# Patient Record
Sex: MALE | Race: WHITE | NOT HISPANIC OR LATINO | Employment: FULL TIME | URBAN - METROPOLITAN AREA
[De-identification: names, ages, dates, MRNs, and addresses within clinical notes are randomized per-mention and may not be internally consistent; named-entity substitution may affect disease eponyms.]

---

## 2018-07-17 ENCOUNTER — OFFICE VISIT (OUTPATIENT)
Dept: URGENT CARE | Facility: URGENT CARE | Age: 63
End: 2018-07-17
Payer: COMMERCIAL

## 2018-07-17 VITALS
TEMPERATURE: 100.2 F | DIASTOLIC BLOOD PRESSURE: 72 MMHG | SYSTOLIC BLOOD PRESSURE: 148 MMHG | RESPIRATION RATE: 24 BRPM | HEART RATE: 80 BPM

## 2018-07-17 DIAGNOSIS — L03.115 CELLULITIS OF RIGHT LOWER EXTREMITY: ICD-10-CM

## 2018-07-17 DIAGNOSIS — M1A.0711 IDIOPATHIC CHRONIC GOUT OF RIGHT FOOT WITH TOPHUS: ICD-10-CM

## 2018-07-17 DIAGNOSIS — M10.9 ACUTE GOUTY ARTHRITIS: Primary | ICD-10-CM

## 2018-07-17 LAB
BASOPHILS # BLD AUTO: 0 10E9/L (ref 0–0.2)
BASOPHILS NFR BLD AUTO: 0.1 %
DIFFERENTIAL METHOD BLD: ABNORMAL
EOSINOPHIL # BLD AUTO: 0.1 10E9/L (ref 0–0.7)
EOSINOPHIL NFR BLD AUTO: 1 %
ERYTHROCYTE [DISTWIDTH] IN BLOOD BY AUTOMATED COUNT: 13.6 % (ref 10–15)
ERYTHROCYTE [SEDIMENTATION RATE] IN BLOOD BY WESTERGREN METHOD: 21 MM/H (ref 0–20)
HCT VFR BLD AUTO: 42.2 % (ref 40–53)
HGB BLD-MCNC: 14 G/DL (ref 13.3–17.7)
LYMPHOCYTES # BLD AUTO: 1.3 10E9/L (ref 0.8–5.3)
LYMPHOCYTES NFR BLD AUTO: 13.5 %
MCH RBC QN AUTO: 32.2 PG (ref 26.5–33)
MCHC RBC AUTO-ENTMCNC: 33.2 G/DL (ref 31.5–36.5)
MCV RBC AUTO: 97 FL (ref 78–100)
MONOCYTES # BLD AUTO: 0.7 10E9/L (ref 0–1.3)
MONOCYTES NFR BLD AUTO: 6.8 %
NEUTROPHILS # BLD AUTO: 7.7 10E9/L (ref 1.6–8.3)
NEUTROPHILS NFR BLD AUTO: 78.6 %
PLATELET # BLD AUTO: 215 10E9/L (ref 150–450)
RBC # BLD AUTO: 4.35 10E12/L (ref 4.4–5.9)
WBC # BLD AUTO: 9.7 10E9/L (ref 4–11)

## 2018-07-17 PROCEDURE — 85652 RBC SED RATE AUTOMATED: CPT | Performed by: NURSE PRACTITIONER

## 2018-07-17 PROCEDURE — 85025 COMPLETE CBC W/AUTO DIFF WBC: CPT | Performed by: NURSE PRACTITIONER

## 2018-07-17 PROCEDURE — 99213 OFFICE O/P EST LOW 20 MIN: CPT | Performed by: NURSE PRACTITIONER

## 2018-07-17 PROCEDURE — 36415 COLL VENOUS BLD VENIPUNCTURE: CPT | Performed by: NURSE PRACTITIONER

## 2018-07-17 PROCEDURE — 84550 ASSAY OF BLOOD/URIC ACID: CPT | Performed by: NURSE PRACTITIONER

## 2018-07-17 RX ORDER — PREDNISONE 20 MG/1
TABLET ORAL
Qty: 10 TABLET | Refills: 0 | Status: SHIPPED | OUTPATIENT
Start: 2018-07-17 | End: 2019-06-27

## 2018-07-17 RX ORDER — COLCHICINE 0.6 MG/1
TABLET ORAL
Qty: 15 TABLET | Refills: 0 | Status: SHIPPED | OUTPATIENT
Start: 2018-07-17 | End: 2019-06-27

## 2018-07-17 RX ORDER — PREDNISONE 20 MG/1
TABLET ORAL
Qty: 16 TABLET | Refills: 0 | Status: CANCELLED | OUTPATIENT
Start: 2018-07-17

## 2018-07-17 RX ORDER — CEPHALEXIN 500 MG/1
500 CAPSULE ORAL 4 TIMES DAILY
Qty: 40 CAPSULE | Refills: 0 | Status: SHIPPED | OUTPATIENT
Start: 2018-07-17 | End: 2019-08-06

## 2018-07-17 ASSESSMENT — PAIN SCALES - GENERAL: PAINLEVEL: EXTREME PAIN (9)

## 2018-07-17 NOTE — MR AVS SNAPSHOT
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  Uric acid was ordered today. If elevated, patient to follow up with primary physician to discuss possible prophylactic medication.  Follow up with primary physician for recheck.      Gout    Gout is an inflammation of a joint due to a build-up of gout crystals in the joint fluid. This occurs when there is an excess of uric acid (a normal waste product) in the body. Uric acid builds up in the body when the kidneys are unable to filter enough of it from the blood. This may occur with age. It is also associated with kidney disease. Gout occurs more often in people with obesity, diabetes, high blood pressure, or high levels of fats in the blood. It may run in families. Gout tends to come and go. A flare up of gout is called an attack. Drinking alcohol or eating certain foods (such as shellfish or foods with additives such as high-fructose corn syrup) may increase uric acid levels in the blood and cause a gout attack.  During a gout attack, the affected joint may become a hot, red, swollen and painful. If you have had one attack of gout, you are likely to have another. An attack of gout can be treated with medicine. If these attacks become frequent, a daily medicine may be prescribed to help the kidneys remove uric acid from the body.  Home care  During a gout attack:    Rest painful joints. If gout affects the joints of your foot or leg, you may want to use crutches for the first few days to keep from bearing weight on the  affected joint.    When sitting or lying down, raise the painful joint to a level higher than your heart.    Apply an ice pack (ice cubes in a plastic bag wrapped in a thin towel) over the injured area for 20 minutes every 1 to 2 hours the first day for pain relief. Continue this 3 to 4 times a day for swelling and pain.    Avoid alcohol and foods listed below (see Preventing attacks) during a gout attack. Drink extra fluid to help flush the uric acid through your kidneys.    If you were prescribed a medicine to treat gout, take it as your healthcare provider has instructed. Don't skip doses.    Take anti-inflammatory medicine as directed.     If pain medicines have been prescribed, take them exactly as directed.    Preventing attacks    Minimize or avoid alcohol use. Excess alcohol intake can cause a gout attack.    Limit these foods and beverages:  ? Organ meats, such as kidneys and liver  ? Certain seafoods (anchovies, sardines, shrimp, scallops, herring, mackerel)  ? Wild game, meat extracts and meat gravies  ? Foods and beverages sweetened with high-fructose corn syrup, such as sodas    Eat a healthy diet including low-fat and nonfat dairy, whole grains, and vegetables.    If you are overweight, talk to your healthcare provider about a weight reduction plan. Avoid fasting or extreme low calorie diets (less than 900 calories per day). This will increase uric acid levels in the body.    If you have diabetes or high blood pressure, work with your doctor to manage these conditions.    Protect the joint from injury. Trauma can trigger a gout attack.  Follow-up care  Follow up with your healthcare provider, or as advised.   When to seek medical advice  Call your healthcare provider if you have any of the following:    Fever over 100.4 F (38. C) with worsening joint pain    Increasing redness around the joint    Pain developing in another joint    Repeated vomiting, abdominal pain, or blood in the vomit or stool  (black or red color)  Date Last Reviewed: 3/1/2017    2398-0301 [employer]. [address] [place] [address], [place] [address]. All rights reserved. This information is not intended as a substitute for professional medical care. Always follow your healthcare professional's instructions.        Treating Gout Attacks     Raising the joint above the level of your heart can help reduce gout symptoms.     Gout is a disease that affects the joints. It is caused by excess uric acid in your blood stream that may lead to crystals forming in your joints. Left untreated, it can lead to painful foot and joint deformities and even kidney problems. But, by treating gout early, you can relieve pain and help prevent future problems. Gout can usually be treated with medication and proper diet. In severe cases, surgery may be needed.  Gout attacks are painful and often happen more than once. Taking medications may reduce pain and prevent attacks in the future. There are also some things you can do at home to relieve symptoms.  Medications for gout  Your healthcare provider may prescribe a daily medication to reduce levels of uric acid. Reducing your uric acid levels may help prevent gout attacks. Allopurinol is one commonly used medication taken daily to reduce uric acid levels. Other medications can help relieve pain and swelling during an acute attack. Medicines such as NSAIDs (nonsteroidal anti-inflammatory medicines), steroids, and colchicine may be prescribed for intermittent use to relieve an acute gout attack. Be sure to take your medication as directed.  What you can do  Below are some things you can do at home to relieve gout symptoms. Your healthcare provider may have other tips.    Rest the painful joint as much as you can.    Raise the painful joint so it is at a level higher than your heart.    Use ice for 10 minutes every 1-2 hours as possible.  How can I prevent gout?  With a little effort, you may be able to  prevent gout attacks in the future. Here are some things you can do:    Avoid foods high in purines  ? Certain meats (red meat, processed meat, turkey)  ? Organ meats (kidney, liver, sweetbread)  ? Shellfish (lobster, crab, shrimp, scallop, mussel)  ? Certain fish (anchovy, sardine, herring, mackerel)    Take any medications prescribed by your healthcare provider.    Lose weight if you need to.    Reduce high fructose corn syrup in meals and drinks.    Reduce or eliminate consumption of alcohol, particularly beer, but also red wine and spirits.    Control blood pressure, diabetes, and cholesterol.    Drink plenty of water to help flush uric acid from your body.  Date Last Reviewed: 2/1/2016 2000-2017 [employer]. [address] [place] [id number]. All rights reserved. This information is not intended as a substitute for professional medical care. Always follow your healthcare professional's instructions.        Gout Diet  Gout is a painful condition caused by an excess of uric acid, a waste product made by the body. Uric acid forms crystals that collect in the joints. The immune response to these crystals brings on symptoms of joint pain and swelling. This is called a gout attack. Often, medications and diet changes are combined to manage gout. Below are some guidelines for changing your diet to help you manage gout and prevent attacks. Your health care provider will help you determine the best eating plan for you.     Eating to manage gout  Weight loss for those who are overweight may help reduce gout attacks.  Eat less of these foods  Eating too many foods containing purines may raise the levels of uric acid in your body. This raises your risk for a gout attack. Try to limit these foods and drinks:    Alcohol, such as beer and red wine. You may be told to avoid alcohol completely.    Soft drinks that contain sugar or high fructose corn syrup    Certain fish, including anchovies, sardines,  fish eggs, and herring    Shellfish    Certain meats, such as red meat, hot dogs, luncheon meats, and turkey    Organ meats, such as liver, kidneys, and sweetbreads    Legumes, such as dried beans and peas    Other high fat foods such as gravy, whole milk, and high fat cheeses    Vegetables such as asparagus, cauliflower, spinach, and mushrooms used to be thought to contribute to an increased risk for a gout attack, but recent studies show that high purine vegetables don't increase the risk for a gout attack.  Eat more of these foods  Other foods may be helpful for people with gout. Add some of these foods to your diet:    Cherries contain chemicals that may lower uric acid.    Omega fatty acids. These are found in some fatty fish such as salmon, certain oils (flax, olive, or nut), and nuts themselves. Omega fatty acids may help prevent inflammation due to gout.    Dairy products that are low-fat or fat-free, such as cheese and yogurt    Complex carbohydrate foods, including whole grains, brown rice, oats, and beans    Coffee, in moderation    Water, approximately 64 ounces per day  Follow-up care  Follow up with your healthcare provider as advised.  When to seek medical advice  Call your healthcare provider right away if any of these occur:    Return of gout symptoms, usually at night:    Severe pain, swelling, and heat in a joint, especially the base of the big toe    Affected joint is hard to move    Skin of the affected joint is purple or red    Fever of 100.4 F (38 C) or higher    Pain that doesn't get better even with prescribed medicine   Date Last Reviewed: 1/12/2016 2000-2017 [employer]. [address] [place] [address], [place] [address]. All rights reserved. This information is not intended as a substitute for professional medical care. Always follow your healthcare professional's instructions.        Eating to Prevent Gout  Gout is a painful form of arthritis caused by an excess of uric acid.  This is a waste product made by the body. It builds up in the body and forms crystals that collect in the joints, causing a gout attack. Alcohol and certain foods can trigger a gout attack. Below are some guidelines for changing your diet to help you manage gout. Your healthcare provider can work with you to determine the best eating plan for you. Know that diet is only one part of managing gout. Take your medicines as prescribed and follow the other guidelines your healthcare provider has given you.  Foods to limit  Eating too many foods containing purines may increase the levels of uric acid in your body and increase your risk for a gout attack. It may be best to limit these high-purine foods:    Alcohol (beer and red wine). You may be told to avoid alcohol completely.    Certain fish (anchovies, sardines, fish roes, herring, tuna, mussels, codfish, scallops, trout, and galo)    Certain meats (red meat, processed meat, boss, turkey, wild game, and goose)    Sauces and gravies made with meat    Organ meats (such as liver, kidneys, sweetbreads, and tripe)    Legumes (such as dried beans and peas)    Mushrooms, spinach, asparagus, and cauliflower    Yeast and yeast extract supplements  Foods to try  Some foods may be helpful for people with gout. You may want to try adding some of the following foods to your diet:    Dark berries. These include blueberries, blackberries, and cherries. These berries contain chemicals that may lower uric acid.    Tofu. Tofu, which is made from soy, is a good source of protein. Studies have shown that it may be a better choice than meat for people with gout.    Omega fatty acids. These acids are found in fatty fish (such as salmon), certain oils (such as flax, olive, or nut oils), or nuts. They may help prevent inflammation due to gout.  The following guidelines are recommended by the American Medical Association for people with gout. Your diet should be:    High in fiber, whole  grains, fruits, and vegetables.    Low in protein (15% of calories should come from protein. Choose lean sources, such as soy, lean meats, and poultry).    Low in fat (no more than 30% of calories should come from fat, with only 10% coming from animal, or saturated, fat).   Date Last Reviewed: 11/1/2017 2000-2017 [employer]. [address] [place] [address]. All rights reserved. This information is not intended as a substitute for professional medical care. Always follow your healthcare professional's instructions.        Discharge Instructions for Cellulitis  You have been diagnosed with cellulitis. This is an infection in the deepest layer of the skin. In some cases, the infection also affects the muscle. Cellulitis is caused by bacteria. The bacteria can enter the body through broken skin. This can happen with a cut, scratch, animal bite, or an insect bite that has been scratched. You may have been treated in the hospital with antibiotics and fluids. You will likely be given a prescription for antibiotics to take at home. This sheet will help you take care of yourself at home.  Home care  When you are home:    Take the prescribed antibiotic medicine you are given as directed until it is gone. Take it even if you feel better. It treats the infection and stops it from returning. Not taking all the medicine can make future infections hard to treat.    Keep the infected area clean.    When possible, raise the infected area above the level of your heart. This helps keep swelling down.    Talk with your healthcare provider if you are in pain. Ask what kind of over-the-counter medicine you can take for pain.    Apply clean bandages as advised.    Take your temperature once a day for a week.    Wash your hands often to prevent spreading the infection.  In the future, wash your hands before and after you touch cuts, scratches, or bandages. This will help prevent infection.   When to call your  healthcare provider  Call your healthcare provider immediately if you have any of the following:    Difficulty or pain when moving the joints above or below the infected area    Discharge or pus draining from the area    Fever of 100.4 F (38 C) or higher, or as directed by your healthcare provider    Pain that gets worse in or around the infected     Redness that gets worse in or around the infected area, particularly if the area of redness expands to a wider area    Shaking chills    Swelling of the infected area    Vomiting   Date Last Reviewed: 8/1/2016 2000-2017 [employer]. [address] [place] [address]. All rights reserved. This information is not intended as a substitute for professional medical care. Always follow your healthcare professional's instructions.                Follow-ups after your visit        Who to contact     If you have questions or need follow up information about today's clinic visit or your schedule please contact [facility] directly at [phone].  Normal or non-critical lab and imaging results will be communicated to you by MyChart, letter or phone within 4 business days after the clinic has received the results. If you do not hear from us within 7 days, please contact the clinic through [employer] or phone. If you have a critical or abnormal lab result, we will notify you by phone as soon as possible.  Submit refill requests through [employer] or call your pharmacy and they will forward the refill request to us. Please allow 3 business days for your refill to be completed.          Additional Information About Your Visit        [employer] Information     [employer] gives you secure access to your electronic health record. If you see a primary care provider, you can also send messages to your care team and make appointments. If you have questions, please call your primary care clinic.  If you do not have a primary care provider,  please call [phone] and they will assist you.        Care EveryWhere ID     This is your Care EveryWhere ID. This could be used by other organizations to access your [place] medical records  [id number]        Your Vitals Were     Pulse Temperature Respirations             80 100.2  F (37.9  C) (Tympanic) 24          Blood Pressure from Last 3 Encounters:   07/17/18 148/72   05/05/16 (!) 160/97   11/23/14 152/79    Weight from Last 3 Encounters:   05/20/14 247 lb (112 kg)   01/27/14 247 lb 6.4 oz (112.2 kg)   07/12/13 244 lb (110.7 kg)              We Performed the Following     CBC with platelets differential     ESR: Erythrocyte sedimentation rate     Uric acid          Today's Medication Changes          These changes are accurate as of 7/17/18  8:20 PM.  If you have any questions, ask your nurse or doctor.               Start taking these medicines.        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   Quantity:  10 tablet   Refills:  0         These medicines have changed or have updated prescriptions.        Dose/Directions    * colchicine 0.6 MG tablet   Commonly known as:  COLCRYS   This may have changed:  Another medication with the same name was added. Make sure you understand how and when to take each.   Changed by:  Eve Morrison APRN CNP        Take 2 tablets at the first sign of flare, take 1 additional tablet one hour later.   Quantity:  15 tablet   Refills:  0       * colchicine 0.6 MG tablet   Commonly known as:  COLCRYS   This may have changed:  You were  already taking a medication with the same name, and this prescription was added. Make sure you understand how and when to take each.   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   Quantity:  15 tablet   Refills:  0       * Notice:  This list has 2 medication(s) that are the same as other medications prescribed for you. Read the directions carefully, and ask your doctor or other care provider to review them with you.         Where to get your medicines      These medications were sent to [id number] - [place], [place] - [address]  [address] [address]    Hours:  Closed 10-16-08 business to [facility] Phone:  [phone]     cephALEXin 500 MG capsule    colchicine 0.6 MG tablet    predniSONE 20 MG tablet                Primary Care Provider Office Phone # Fax #    José Miguel Maynard [phone] [address] [facility] [address]        Equal Access to Services     [facility]: Hadii darshana ku hadasho Soomaali, waaxda luqadaha, qaybta kaalmada adeegyada, waxay tish brewer . So [facility] [phone].    ATENCIÓN: Si habla español, tiene a rivera disposición servicios gratuitos de asistencia lingüística. Llame al [phone].    We comply with applicable federal civil rights laws and [place] laws. We do not discriminate on the basis of race, color, national origin, age, disability, sex, sexual orientation, or gender identity.            Thank you!     Thank you for choosing [facility]  for your care. Our goal is always to provide you with excellent care. Hearing back from our patients is one way we can continue to improve our services. Please take a few minutes to complete the written survey that you may receive in the mail after your visit with us. Thank  you!             Your Updated Medication List - Protect others around you: Learn how to safely use, store and throw away your medicines at www.disposemymeds.org.          This list is accurate as of 7/17/18  8:20 PM.  Always use your most recent med list.                   Brand Name Dispense Instructions for use Diagnosis    cephALEXin 500 MG capsule    KEFLEX    40 capsule    Take 1 capsule (500 mg) by mouth 4 times daily    Cellulitis of right lower extremity       * colchicine 0.6 MG tablet    COLCRYS    15 tablet    Take 2 tablets at the first sign of flare, take 1 additional tablet one hour later.        * colchicine 0.6 MG tablet    COLCRYS    15 tablet    Take 2 tablets at the first sign of flare, take 1 additional tablet one hour later.    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 Read the directions carefully, and ask your doctor or other care provider to review them with you.

## 2018-07-18 LAB — URATE SERPL-MCNC: 7.7 MG/DL (ref 3.5–7.2)

## 2018-07-18 NOTE — NURSING NOTE
"Chief Complaint   Patient presents with     Arthritis     Started in thumb on Saturday.  Right foot started very early this morning        Initial /72 (BP Location: Right arm, Cuff Size: Adult Large)  Pulse 80  Temp 100.2  F (37.9  C) (Tympanic)  Resp 24 Estimated body mass index is 35.19 kg/(m^2) as calculated from the following:    Height as of 1/27/14: 5' 10.25\" (1.784 m).    Weight as of 5/20/14: 247 lb (112 kg).      Health Maintenance that is potentially due pending provider review:  NONE    n/a    Is there anyone who you would like to be able to receive your results? Not Applicable  If yes have patient fill out DRISS  Shady Baldwin M.A.        "

## 2018-07-18 NOTE — PATIENT INSTRUCTIONS
Activity as tolerated.  Uric acid was ordered today. If elevated, patient to follow up with primary physician to discuss possible prophylactic medication.  Follow up with primary physician for recheck.      Gout    Gout is an inflammation of a joint due to a build-up of gout crystals in the joint fluid. This occurs when there is an excess of uric acid (a normal waste product) in the body. Uric acid builds up in the body when the kidneys are unable to filter enough of it from the blood. This may occur with age. It is also associated with kidney disease. Gout occurs more often in people with obesity, diabetes, high blood pressure, or high levels of fats in the blood. It may run in families. Gout tends to come and go. A flare up of gout is called an attack. Drinking alcohol or eating certain foods (such as shellfish or foods with additives such as high-fructose corn syrup) may increase uric acid levels in the blood and cause a gout attack.  During a gout attack, the affected joint may become a hot, red, swollen and painful. If you have had one attack of gout, you are likely to have another. An attack of gout can be treated with medicine. If these attacks become frequent, a daily medicine may be prescribed to help the kidneys remove uric acid from the body.  Home care  During a gout attack:    Rest painful joints. If gout affects the joints of your foot or leg, you may want to use crutches for the first few days to keep from bearing weight on the affected joint.    When sitting or lying down, raise the painful joint to a level higher than your heart.    Apply an ice pack (ice cubes in a plastic bag wrapped in a thin towel) over the injured area for 20 minutes every 1 to 2 hours the first day for pain relief. Continue this 3 to 4 times a day for swelling and pain.    Avoid alcohol and foods listed below (see Preventing attacks) during a gout attack. Drink extra fluid to help flush the uric acid through your  kidneys.    If you were prescribed a medicine to treat gout, take it as your healthcare provider has instructed. Don't skip doses.    Take anti-inflammatory medicine as directed.     If pain medicines have been prescribed, take them exactly as directed.    Preventing attacks    Minimize or avoid alcohol use. Excess alcohol intake can cause a gout attack.    Limit these foods and beverages:  ? Organ meats, such as kidneys and liver  ? Certain seafoods (anchovies, sardines, shrimp, scallops, herring, mackerel)  ? Wild game, meat extracts and meat gravies  ? Foods and beverages sweetened with high-fructose corn syrup, such as sodas    Eat a healthy diet including low-fat and nonfat dairy, whole grains, and vegetables.    If you are overweight, talk to your healthcare provider about a weight reduction plan. Avoid fasting or extreme low calorie diets (less than 900 calories per day). This will increase uric acid levels in the body.    If you have diabetes or high blood pressure, work with your doctor to manage these conditions.    Protect the joint from injury. Trauma can trigger a gout attack.  Follow-up care  Follow up with your healthcare provider, or as advised.   When to seek medical advice  Call your healthcare provider if you have any of the following:    Fever over 100.4 F (38. C) with worsening joint pain    Increasing redness around the joint    Pain developing in another joint    Repeated vomiting, abdominal pain, or blood in the vomit or stool (black or red color)  Date Last Reviewed: 3/1/2017    7572-5739 [employer]. [address] [place] [address]. All rights reserved. This information is not intended as a substitute for professional medical care. Always follow your healthcare professional's instructions.        Treating Gout Attacks     Raising the joint above the level of your heart can help reduce gout symptoms.     Gout is a disease that affects the joints. It is caused by  excess uric acid in your blood stream that may lead to crystals forming in your joints. Left untreated, it can lead to painful foot and joint deformities and even kidney problems. But, by treating gout early, you can relieve pain and help prevent future problems. Gout can usually be treated with medication and proper diet. In severe cases, surgery may be needed.  Gout attacks are painful and often happen more than once. Taking medications may reduce pain and prevent attacks in the future. There are also some things you can do at home to relieve symptoms.  Medications for gout  Your healthcare provider may prescribe a daily medication to reduce levels of uric acid. Reducing your uric acid levels may help prevent gout attacks. Allopurinol is one commonly used medication taken daily to reduce uric acid levels. Other medications can help relieve pain and swelling during an acute attack. Medicines such as NSAIDs (nonsteroidal anti-inflammatory medicines), steroids, and colchicine may be prescribed for intermittent use to relieve an acute gout attack. Be sure to take your medication as directed.  What you can do  Below are some things you can do at home to relieve gout symptoms. Your healthcare provider may have other tips.    Rest the painful joint as much as you can.    Raise the painful joint so it is at a level higher than your heart.    Use ice for 10 minutes every 1-2 hours as possible.  How can I prevent gout?  With a little effort, you may be able to prevent gout attacks in the future. Here are some things you can do:    Avoid foods high in purines  ? Certain meats (red meat, processed meat, turkey)  ? Organ meats (kidney, liver, sweetbread)  ? Shellfish (lobster, crab, shrimp, scallop, mussel)  ? Certain fish (anchovy, sardine, herring, mackerel)    Take any medications prescribed by your healthcare provider.    Lose weight if you need to.    Reduce high fructose corn syrup in meals and drinks.    Reduce or  eliminate consumption of alcohol, particularly beer, but also red wine and spirits.    Control blood pressure, diabetes, and cholesterol.    Drink plenty of water to help flush uric acid from your body.  Date Last Reviewed: 2/1/2016 2000-2017 [employer]. [address] [place] [id number]. All rights reserved. This information is not intended as a substitute for professional medical care. Always follow your healthcare professional's instructions.        Gout Diet  Gout is a painful condition caused by an excess of uric acid, a waste product made by the body. Uric acid forms crystals that collect in the joints. The immune response to these crystals brings on symptoms of joint pain and swelling. This is called a gout attack. Often, medications and diet changes are combined to manage gout. Below are some guidelines for changing your diet to help you manage gout and prevent attacks. Your health care provider will help you determine the best eating plan for you.     Eating to manage gout  Weight loss for those who are overweight may help reduce gout attacks.  Eat less of these foods  Eating too many foods containing purines may raise the levels of uric acid in your body. This raises your risk for a gout attack. Try to limit these foods and drinks:    Alcohol, such as beer and red wine. You may be told to avoid alcohol completely.    Soft drinks that contain sugar or high fructose corn syrup    Certain fish, including anchovies, sardines, fish eggs, and herring    Shellfish    Certain meats, such as red meat, hot dogs, luncheon meats, and turkey    Organ meats, such as liver, kidneys, and sweetbreads    Legumes, such as dried beans and peas    Other high fat foods such as gravy, whole milk, and high fat cheeses    Vegetables such as asparagus, cauliflower, spinach, and mushrooms used to be thought to contribute to an increased risk for a gout attack, but recent studies show that high purine  vegetables don't increase the risk for a gout attack.  Eat more of these foods  Other foods may be helpful for people with gout. Add some of these foods to your diet:    Cherries contain chemicals that may lower uric acid.    Omega fatty acids. These are found in some fatty fish such as salmon, certain oils (flax, olive, or nut), and nuts themselves. Omega fatty acids may help prevent inflammation due to gout.    Dairy products that are low-fat or fat-free, such as cheese and yogurt    Complex carbohydrate foods, including whole grains, brown rice, oats, and beans    Coffee, in moderation    Water, approximately 64 ounces per day  Follow-up care  Follow up with your healthcare provider as advised.  When to seek medical advice  Call your healthcare provider right away if any of these occur:    Return of gout symptoms, usually at night:    Severe pain, swelling, and heat in a joint, especially the base of the big toe    Affected joint is hard to move    Skin of the affected joint is purple or red    Fever of 100.4 F (38 C) or higher    Pain that doesn't get better even with prescribed medicine   Date Last Reviewed: 1/12/2016 2000-2017 [employer]. [address] [place] [address]. All rights reserved. This information is not intended as a substitute for professional medical care. Always follow your healthcare professional's instructions.        Eating to Prevent Gout  Gout is a painful form of arthritis caused by an excess of uric acid. This is a waste product made by the body. It builds up in the body and forms crystals that collect in the joints, causing a gout attack. Alcohol and certain foods can trigger a gout attack. Below are some guidelines for changing your diet to help you manage gout. Your healthcare provider can work with you to determine the best eating plan for you. Know that diet is only one part of managing gout. Take your medicines as prescribed and follow the other  guidelines your healthcare provider has given you.  Foods to limit  Eating too many foods containing purines may increase the levels of uric acid in your body and increase your risk for a gout attack. It may be best to limit these high-purine foods:    Alcohol (beer and red wine). You may be told to avoid alcohol completely.    Certain fish (anchovies, sardines, fish roes, herring, tuna, mussels, codfish, scallops, trout, and galo)    Certain meats (red meat, processed meat, boss, turkey, wild game, and goose)    Sauces and gravies made with meat    Organ meats (such as liver, kidneys, sweetbreads, and tripe)    Legumes (such as dried beans and peas)    Mushrooms, spinach, asparagus, and cauliflower    Yeast and yeast extract supplements  Foods to try  Some foods may be helpful for people with gout. You may want to try adding some of the following foods to your diet:    Dark berries. These include blueberries, blackberries, and cherries. These berries contain chemicals that may lower uric acid.    Tofu. Tofu, which is made from soy, is a good source of protein. Studies have shown that it may be a better choice than meat for people with gout.    Omega fatty acids. These acids are found in fatty fish (such as salmon), certain oils (such as flax, olive, or nut oils), or nuts. They may help prevent inflammation due to gout.  The following guidelines are recommended by the American Medical Association for people with gout. Your diet should be:    High in fiber, whole grains, fruits, and vegetables.    Low in protein (15% of calories should come from protein. Choose lean sources, such as soy, lean meats, and poultry).    Low in fat (no more than 30% of calories should come from fat, with only 10% coming from animal, or saturated, fat).   Date Last Reviewed: 11/1/2017 2000-2017 [employer]. [address] [place] [address], [place] [address]. All rights reserved. This information is not intended as a substitute  for professional medical care. Always follow your healthcare professional's instructions.        Discharge Instructions for Cellulitis  You have been diagnosed with cellulitis. This is an infection in the deepest layer of the skin. In some cases, the infection also affects the muscle. Cellulitis is caused by bacteria. The bacteria can enter the body through broken skin. This can happen with a cut, scratch, animal bite, or an insect bite that has been scratched. You may have been treated in the hospital with antibiotics and fluids. You will likely be given a prescription for antibiotics to take at home. This sheet will help you take care of yourself at home.  Home care  When you are home:    Take the prescribed antibiotic medicine you are given as directed until it is gone. Take it even if you feel better. It treats the infection and stops it from returning. Not taking all the medicine can make future infections hard to treat.    Keep the infected area clean.    When possible, raise the infected area above the level of your heart. This helps keep swelling down.    Talk with your healthcare provider if you are in pain. Ask what kind of over-the-counter medicine you can take for pain.    Apply clean bandages as advised.    Take your temperature once a day for a week.    Wash your hands often to prevent spreading the infection.  In the future, wash your hands before and after you touch cuts, scratches, or bandages. This will help prevent infection.   When to call your healthcare provider  Call your healthcare provider immediately if you have any of the following:    Difficulty or pain when moving the joints above or below the infected area    Discharge or pus draining from the area    Fever of 100.4 F (38 C) or higher, or as directed by your healthcare provider    Pain that gets worse in or around the infected     Redness that gets worse in or around the infected area, particularly if the area of redness expands to a  wider area    Shaking chills    Swelling of the infected area    Vomiting   Date Last Reviewed: 8/1/2016 2000-2017 [employer]. [address] [place] [address], [place] [address]. All rights reserved. This information is not intended as a substitute for professional medical care. Always follow your healthcare professional's instructions.

## 2018-07-18 NOTE — PROGRESS NOTES
SUBJECTIVE:  Elvin Ware is a 63 year old male who presents to the clinic today for a possible gout episode. Location: 1st MTP joint of the  and feet  Onset of symptoms: 1 days ago.  Symptoms include: pain, swelling, redness  Any possible trigger?: no  Exacerbation history: every  1 3  month .  This exacerbation similar to previous attacks?: yes  Treatments tried for this attack: Tylenol, elevation, rest  Medications currently being used for gout: none     Past Medical History:   Diagnosis Date     Gout     history of gout       ROS:  CONSTITUTIONAL:NEGATIVE for fever, chills, change in weight  INTEGUMENTARY/SKIN: NEGATIVE for worrisome rashes, moles or lesions  EYES: NEGATIVE for vision changes or irritation  ENT/MOUTH: NEGATIVE for ear, mouth and throat problems  RESP:NEGATIVE for significant cough or SOB  CV: NEGATIVE for chest pain, palpitations or peripheral edema  GI: NEGATIVE for nausea, abdominal pain, heartburn, or change in bowel habits  MUSCULOSKELETAL: See above   NEURO: NEGATIVE for weakness, dizziness or paresthesias      OBJECTIVE:  /72 (BP Location: Right arm, Cuff Size: Adult Large)  Pulse 80  Temp 100.2  F (37.9  C) (Tympanic)  Resp 24   GENERAL APPEARANCE: healthy, alert and no distress  EYES: Eyes grossly normal to inspection, fundi benign and PERRL  HENT: ear canals and TM's normal and nose and mouth without ulcers or lesions  NECK: no adenopathy, no asymmetry, masses, or scars and thyroid normal to palpation  RESP: lungs clear to auscultation - no rales, rhonchi or wheezes  CV: regular rates and rhythm, normal S1 S2, no S3 or S4 and no murmur, click or rub -  MS: extremities normal- no gross deformities noted  SKIN: no suspicious lesions or rashes  NEURO: Normal strength and tone, sensory exam grossly normal, mentation intact and speech normal  EXAM:  swelling, redness, tenderness over the first metatarso-phalangeal joint. Extending into the foot.  Small abrasion noted at the tip  of the greater toe     Results for orders placed or performed in visit on 07/17/18   ESR: Erythrocyte sedimentation rate   Result Value Ref Range    Sed Rate 21 (H) 0 - 20 mm/h   CBC with platelets differential   Result Value Ref Range    WBC 9.7 4.0 - 11.0 10e9/L    RBC Count 4.35 (L) 4.4 - 5.9 10e12/L    Hemoglobin 14.0 13.3 - 17.7 g/dL    Hematocrit 42.2 40.0 - 53.0 %    MCV 97 78 - 100 fl    MCH 32.2 26.5 - 33.0 pg    MCHC 33.2 31.5 - 36.5 g/dL    RDW 13.6 10.0 - 15.0 %    Platelet Count 215 150 - 450 10e9/L    Diff Method Automated Method     % Neutrophils 78.6 %    % Lymphocytes 13.5 %    % Monocytes 6.8 %    % Eosinophils 1.0 %    % Basophils 0.1 %    Absolute Neutrophil 7.7 1.6 - 8.3 10e9/L    Absolute Lymphocytes 1.3 0.8 - 5.3 10e9/L    Absolute Monocytes 0.7 0.0 - 1.3 10e9/L    Absolute Eosinophils 0.1 0.0 - 0.7 10e9/L    Absolute Basophils 0.0 0.0 - 0.2 10e9/L         ASSESSMENT:    ICD-10-CM    1. Acute gouty arthritis M10.9 Uric acid     ESR: Erythrocyte sedimentation rate     CBC with platelets differential   2. 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 Cellulitis of right lower extremity L03.115 cephALEXin (KEFLEX) 500 MG capsule         PLAN:  Patient presented with typical presentation of his gout.  His typical flareups are every 3 months patient did have a low-grade fever with this flareup.  Workup for septic joint then with white blood cell count within normal limits sed rate mildly elevated did see an abrasion to the greater toe concern that cellulitis is also component with the goal will treat with  a course of Keflex and he will be on prednisone and colchicine for his gout.      Patient Instructions     Activity as tolerated.  Uric acid was ordered today. If elevated, patient to follow up with primary physician to discuss possible prophylactic medication.  Follow up with primary physician for  recheck.      Gout    Gout is an inflammation of a joint due to a build-up of gout crystals in the joint fluid. This occurs when there is an excess of uric acid (a normal waste product) in the body. Uric acid builds up in the body when the kidneys are unable to filter enough of it from the blood. This may occur with age. It is also associated with kidney disease. Gout occurs more often in people with obesity, diabetes, high blood pressure, or high levels of fats in the blood. It may run in families. Gout tends to come and go. A flare up of gout is called an attack. Drinking alcohol or eating certain foods (such as shellfish or foods with additives such as high-fructose corn syrup) may increase uric acid levels in the blood and cause a gout attack.  During a gout attack, the affected joint may become a hot, red, swollen and painful. If you have had one attack of gout, you are likely to have another. An attack of gout can be treated with medicine. If these attacks become frequent, a daily medicine may be prescribed to help the kidneys remove uric acid from the body.  Home care  During a gout attack:    Rest painful joints. If gout affects the joints of your foot or leg, you may want to use crutches for the first few days to keep from bearing weight on the affected joint.    When sitting or lying down, raise the painful joint to a level higher than your heart.    Apply an ice pack (ice cubes in a plastic bag wrapped in a thin towel) over the injured area for 20 minutes every 1 to 2 hours the first day for pain relief. Continue this 3 to 4 times a day for swelling and pain.    Avoid alcohol and foods listed below (see Preventing attacks) during a gout attack. Drink extra fluid to help flush the uric acid through your kidneys.    If you were prescribed a medicine to treat gout, take it as your healthcare provider has instructed. Don't skip doses.    Take anti-inflammatory medicine as directed.     If pain medicines have  been prescribed, take them exactly as directed.    Preventing attacks    Minimize or avoid alcohol use. Excess alcohol intake can cause a gout attack.    Limit these foods and beverages:  ? Organ meats, such as kidneys and liver  ? Certain seafoods (anchovies, sardines, shrimp, scallops, herring, mackerel)  ? Wild game, meat extracts and meat gravies  ? Foods and beverages sweetened with high-fructose corn syrup, such as sodas    Eat a healthy diet including low-fat and nonfat dairy, whole grains, and vegetables.    If you are overweight, talk to your healthcare provider about a weight reduction plan. Avoid fasting or extreme low calorie diets (less than 900 calories per day). This will increase uric acid levels in the body.    If you have diabetes or high blood pressure, work with your doctor to manage these conditions.    Protect the joint from injury. Trauma can trigger a gout attack.  Follow-up care  Follow up with your healthcare provider, or as advised.   When to seek medical advice  Call your healthcare provider if you have any of the following:    Fever over 100.4 F (38. C) with worsening joint pain    Increasing redness around the joint    Pain developing in another joint    Repeated vomiting, abdominal pain, or blood in the vomit or stool (black or red color)  Date Last Reviewed: 3/1/2017    5493-0856 [employer]. [address] [place] [address]. All rights reserved. This information is not intended as a substitute for professional medical care. Always follow your healthcare professional's instructions.        Treating Gout Attacks     Raising the joint above the level of your heart can help reduce gout symptoms.     Gout is a disease that affects the joints. It is caused by excess uric acid in your blood stream that may lead to crystals forming in your joints. Left untreated, it can lead to painful foot and joint deformities and even kidney problems. But, by treating gout early,  you can relieve pain and help prevent future problems. Gout can usually be treated with medication and proper diet. In severe cases, surgery may be needed.  Gout attacks are painful and often happen more than once. Taking medications may reduce pain and prevent attacks in the future. There are also some things you can do at home to relieve symptoms.  Medications for gout  Your healthcare provider may prescribe a daily medication to reduce levels of uric acid. Reducing your uric acid levels may help prevent gout attacks. Allopurinol is one commonly used medication taken daily to reduce uric acid levels. Other medications can help relieve pain and swelling during an acute attack. Medicines such as NSAIDs (nonsteroidal anti-inflammatory medicines), steroids, and colchicine may be prescribed for intermittent use to relieve an acute gout attack. Be sure to take your medication as directed.  What you can do  Below are some things you can do at home to relieve gout symptoms. Your healthcare provider may have other tips.    Rest the painful joint as much as you can.    Raise the painful joint so it is at a level higher than your heart.    Use ice for 10 minutes every 1-2 hours as possible.  How can I prevent gout?  With a little effort, you may be able to prevent gout attacks in the future. Here are some things you can do:    Avoid foods high in purines  ? Certain meats (red meat, processed meat, turkey)  ? Organ meats (kidney, liver, sweetbread)  ? Shellfish (lobster, crab, shrimp, scallop, mussel)  ? Certain fish (anchovy, sardine, herring, mackerel)    Take any medications prescribed by your healthcare provider.    Lose weight if you need to.    Reduce high fructose corn syrup in meals and drinks.    Reduce or eliminate consumption of alcohol, particularly beer, but also red wine and spirits.    Control blood pressure, diabetes, and cholesterol.    Drink plenty of water to help flush uric acid from your body.  Date Last  Reviewed: 2/1/2016 2000-2017 [employer]. [address] [place] [address], [place] [address]. All rights reserved. This information is not intended as a substitute for professional medical care. Always follow your healthcare professional's instructions.        Gout Diet  Gout is a painful condition caused by an excess of uric acid, a waste product made by the body. Uric acid forms crystals that collect in the joints. The immune response to these crystals brings on symptoms of joint pain and swelling. This is called a gout attack. Often, medications and diet changes are combined to manage gout. Below are some guidelines for changing your diet to help you manage gout and prevent attacks. Your health care provider will help you determine the best eating plan for you.     Eating to manage gout  Weight loss for those who are overweight may help reduce gout attacks.  Eat less of these foods  Eating too many foods containing purines may raise the levels of uric acid in your body. This raises your risk for a gout attack. Try to limit these foods and drinks:    Alcohol, such as beer and red wine. You may be told to avoid alcohol completely.    Soft drinks that contain sugar or high fructose corn syrup    Certain fish, including anchovies, sardines, fish eggs, and herring    Shellfish    Certain meats, such as red meat, hot dogs, luncheon meats, and turkey    Organ meats, such as liver, kidneys, and sweetbreads    Legumes, such as dried beans and peas    Other high fat foods such as gravy, whole milk, and high fat cheeses    Vegetables such as asparagus, cauliflower, spinach, and mushrooms used to be thought to contribute to an increased risk for a gout attack, but recent studies show that high purine vegetables don't increase the risk for a gout attack.  Eat more of these foods  Other foods may be helpful for people with gout. Add some of these foods to your diet:    Cherries contain chemicals that may lower uric  acid.    Omega fatty acids. These are found in some fatty fish such as salmon, certain oils (flax, olive, or nut), and nuts themselves. Omega fatty acids may help prevent inflammation due to gout.    Dairy products that are low-fat or fat-free, such as cheese and yogurt    Complex carbohydrate foods, including whole grains, brown rice, oats, and beans    Coffee, in moderation    Water, approximately 64 ounces per day  Follow-up care  Follow up with your healthcare provider as advised.  When to seek medical advice  Call your healthcare provider right away if any of these occur:    Return of gout symptoms, usually at night:    Severe pain, swelling, and heat in a joint, especially the base of the big toe    Affected joint is hard to move    Skin of the affected joint is purple or red    Fever of 100.4 F (38 C) or higher    Pain that doesn't get better even with prescribed medicine   Date Last Reviewed: 1/12/2016 2000-2017 [employer]. [address] [place] [address]. All rights reserved. This information is not intended as a substitute for professional medical care. Always follow your healthcare professional's instructions.        Eating to Prevent Gout  Gout is a painful form of arthritis caused by an excess of uric acid. This is a waste product made by the body. It builds up in the body and forms crystals that collect in the joints, causing a gout attack. Alcohol and certain foods can trigger a gout attack. Below are some guidelines for changing your diet to help you manage gout. Your healthcare provider can work with you to determine the best eating plan for you. Know that diet is only one part of managing gout. Take your medicines as prescribed and follow the other guidelines your healthcare provider has given you.  Foods to limit  Eating too many foods containing purines may increase the levels of uric acid in your body and increase your risk for a gout attack. It may be best to limit  these high-purine foods:    Alcohol (beer and red wine). You may be told to avoid alcohol completely.    Certain fish (anchovies, sardines, fish roes, herring, tuna, mussels, codfish, scallops, trout, and galo)    Certain meats (red meat, processed meat, boss, turkey, wild game, and goose)    Sauces and gravies made with meat    Organ meats (such as liver, kidneys, sweetbreads, and tripe)    Legumes (such as dried beans and peas)    Mushrooms, spinach, asparagus, and cauliflower    Yeast and yeast extract supplements  Foods to try  Some foods may be helpful for people with gout. You may want to try adding some of the following foods to your diet:    Dark berries. These include blueberries, blackberries, and cherries. These berries contain chemicals that may lower uric acid.    Tofu. Tofu, which is made from soy, is a good source of protein. Studies have shown that it may be a better choice than meat for people with gout.    Omega fatty acids. These acids are found in fatty fish (such as salmon), certain oils (such as flax, olive, or nut oils), or nuts. They may help prevent inflammation due to gout.  The following guidelines are recommended by the American Medical Association for people with gout. Your diet should be:    High in fiber, whole grains, fruits, and vegetables.    Low in protein (15% of calories should come from protein. Choose lean sources, such as soy, lean meats, and poultry).    Low in fat (no more than 30% of calories should come from fat, with only 10% coming from animal, or saturated, fat).   Date Last Reviewed: 11/1/2017 2000-2017 [employer]. [address] [place] [address], [place] [address]. All rights reserved. This information is not intended as a substitute for professional medical care. Always follow your healthcare professional's instructions.        Discharge Instructions for Cellulitis  You have been diagnosed with cellulitis. This is an infection in the deepest layer of the  skin. In some cases, the infection also affects the muscle. Cellulitis is caused by bacteria. The bacteria can enter the body through broken skin. This can happen with a cut, scratch, animal bite, or an insect bite that has been scratched. You may have been treated in the hospital with antibiotics and fluids. You will likely be given a prescription for antibiotics to take at home. This sheet will help you take care of yourself at home.  Home care  When you are home:    Take the prescribed antibiotic medicine you are given as directed until it is gone. Take it even if you feel better. It treats the infection and stops it from returning. Not taking all the medicine can make future infections hard to treat.    Keep the infected area clean.    When possible, raise the infected area above the level of your heart. This helps keep swelling down.    Talk with your healthcare provider if you are in pain. Ask what kind of over-the-counter medicine you can take for pain.    Apply clean bandages as advised.    Take your temperature once a day for a week.    Wash your hands often to prevent spreading the infection.  In the future, wash your hands before and after you touch cuts, scratches, or bandages. This will help prevent infection.   When to call your healthcare provider  Call your healthcare provider immediately if you have any of the following:    Difficulty or pain when moving the joints above or below the infected area    Discharge or pus draining from the area    Fever of 100.4 F (38 C) or higher, or as directed by your healthcare provider    Pain that gets worse in or around the infected     Redness that gets worse in or around the infected area, particularly if the area of redness expands to a wider area    Shaking chills    Swelling of the infected area    Vomiting   Date Last Reviewed: 8/1/2016 2000-2017 [employer]. [address] [place] [address], [place] [address]. All rights reserved. This information is  not intended as a substitute for professional medical care. Always follow your healthcare professional's instructions.            DARIELA Romo CNP

## 2019-06-27 ENCOUNTER — HOSPITAL ENCOUNTER (EMERGENCY)
Facility: CLINIC | Age: 64
Discharge: HOME | End: 2019-06-27
Attending: NURSE PRACTITIONER | Admitting: NURSE PRACTITIONER
Payer: COMMERCIAL

## 2019-06-27 VITALS
TEMPERATURE: 98.1 F | SYSTOLIC BLOOD PRESSURE: 193 MMHG | WEIGHT: 255 LBS | DIASTOLIC BLOOD PRESSURE: 86 MMHG | HEIGHT: 72 IN | BODY MASS INDEX: 34.54 KG/M2 | HEART RATE: 74 BPM | OXYGEN SATURATION: 97 % | RESPIRATION RATE: 18 BRPM

## 2019-06-27 DIAGNOSIS — M10.9 GOUT: ICD-10-CM

## 2019-06-27 DIAGNOSIS — M1A.0711 IDIOPATHIC CHRONIC GOUT OF RIGHT FOOT WITH TOPHUS: ICD-10-CM

## 2019-06-27 PROCEDURE — 99213 OFFICE O/P EST LOW 20 MIN: CPT | Mod: Z6 | Performed by: NURSE PRACTITIONER

## 2019-06-27 PROCEDURE — G0463 HOSPITAL OUTPT CLINIC VISIT: HCPCS

## 2019-06-27 RX ORDER — PREDNISONE 20 MG/1
TABLET ORAL
Qty: 10 TABLET | Refills: 0 | Status: SHIPPED | OUTPATIENT
Start: 2019-06-27 | End: 2019-08-06

## 2019-06-27 RX ORDER — COLCHICINE 0.6 MG/1
TABLET ORAL
Qty: 15 TABLET | Refills: 0 | Status: SHIPPED | OUTPATIENT
Start: 2019-06-27 | End: 2019-08-06

## 2019-06-27 ASSESSMENT — ENCOUNTER SYMPTOMS
MYALGIAS: 0
CHILLS: 0
ARTHRALGIAS: 1
FATIGUE: 0
WEAKNESS: 0
COLOR CHANGE: 0
RESPIRATORY NEGATIVE: 1
JOINT SWELLING: 1
HEADACHES: 0
NUMBNESS: 0
COUGH: 0
CARDIOVASCULAR NEGATIVE: 1
FEVER: 0
WOUND: 0
SHORTNESS OF BREATH: 0
LIGHT-HEADEDNESS: 0

## 2019-06-27 ASSESSMENT — MIFFLIN-ST. JEOR: SCORE: 1989.67

## 2019-06-27 NOTE — ED AVS SNAPSHOT
[facility] Emergency Department  [address] [address]  Phone:  [phone]  Fax:  [phone]                                    Elvin Ware   MRN: [id number]    Department:  [facility] Emergency Department   Date of Visit:  6/27/2019           After Visit Summary Signature Page    I have received my discharge instructions, and my questions have been answered. I have discussed any challenges I see with this plan with the nurse or doctor.    ..........................................................................................................................................  Patient/Patient Representative Signature      ..........................................................................................................................................  Patient Representative Print Name and Relationship to Patient    ..................................................               ................................................  Date                                   Time    ..........................................................................................................................................  Reviewed by Signature/Title    ...................................................              ..............................................  Date                                               Time          22EPIC Rev 08/18

## 2019-06-28 NOTE — ED PROVIDER NOTES
History     Chief Complaint   Patient presents with     Arthritis     HX of Gout  out of medications      HPI  Elvin YOEL Ware is a 64 year old male who presents the urgent care due to complaints of gout flareup into the left hand.  Patient noticed the increasing pain and edema to the left hand first and second knuckles beginning today.  Denies any erythema, fevers, decreased range of motion and numbness or tingling.  Last gout flareup was 1 month ago and was treated with colchicine and prednisone with good results.  Patient has primary care appointment scheduled regarding preventative with gout dosing.  Patient has been applying ice at home and taking Tylenol.    Allergies:  Allergies   Allergen Reactions     Bees      Morphine      Penicillins      Indomethacin Rash       Problem List:    Patient Active Problem List    Diagnosis Date Noted     Gout 12/03/2012     Priority: Medium     Advanced directives, counseling/discussion 12/03/2012     Priority: Medium     Discussed advance care planning with patient; information given to patient to review. 12/3/2012        Tobacco abuse 01/03/2011     Priority: Medium     CARDIOVASCULAR SCREENING; LDL GOAL LESS THAN 130 10/31/2010     Priority: Medium     [place] Risk Score: 30% (19 Total Points)- advised to follow up with PCP soon- may benefit from statin         Cervical Radiculopathy at C6-left 10/06/2008     Priority: Medium     Rib fractures 07/02/2008     Priority: Medium     Fracture of clavicle, closed 07/01/2008     Priority: Medium     left  (Problem list name updated by automated process. Provider to review and confirm.)       Pneumohemothorax 07/01/2008     Priority: Medium     bilateral with right worse than left          Past Medical History:    Past Medical History:   Diagnosis Date     Gout        Past Surgical History:    Past Surgical History:   Procedure Laterality Date     C APPENDECTOMY       SURGICAL HISTORY OF -       right knee scope        Family History:    Family History   Problem Relation Age of Onset     Diabetes Mother      Breast Cancer Mother      C.A.D. Mother         MI age 64     Respiratory Father         copd, like, exposure to chemical     Cancer Father         lung cancer     Respiratory Maternal Grandfather         copd     Cerebrovascular Disease Paternal Grandmother      Neurologic Disorder Brother         post polio complications     Cancer - colorectal No family hx of      Prostate Cancer No family hx of        Social History:  Marital Status:   [5]  Social History     Tobacco Use     Smoking status: Current Every Day Smoker     Packs/day: 0.50     Years: 30.00     Pack years: 15.00     Types: Cigarettes     Last attempt to quit: 2008     Years since quittin.0     Smokeless tobacco: Never Used   Substance Use Topics     Alcohol use: Yes     Comment: rare      Drug use: No        Medications:      colchicine (COLCRYS) 0.6 MG tablet   predniSONE (DELTASONE) 20 MG tablet   cephALEXin (KEFLEX) 500 MG capsule   DM-Doxylamine-Acetaminophen (VICKS NYQUIL MULTI-SYMPTOM PO)         Review of Systems   Constitutional: Negative for chills, fatigue and fever.   Respiratory: Negative.  Negative for cough and shortness of breath.    Cardiovascular: Negative.  Negative for chest pain.   Musculoskeletal: Positive for arthralgias and joint swelling. Negative for myalgias.   Skin: Negative for color change, pallor, rash and wound.   Neurological: Negative for weakness, light-headedness, numbness and headaches.       Physical Exam   BP: 193/86  Pulse: 74  Temp: 98.1  F (36.7  C)  Resp: 18  Height: 182.9 cm (6')  Weight: 115.7 kg (255 lb)  SpO2: 97 %      Physical Exam   Constitutional: He appears well-developed and well-nourished.   Cardiovascular: Normal rate and regular rhythm.   Pulmonary/Chest: Effort normal and breath sounds normal. No respiratory distress.   Musculoskeletal:   Inflammation and tenderness to the left 2nd  and 3rd joints. No erythema. CMS intact.   Neurological: He is alert.   Skin: Skin is warm. Capillary refill takes less than 2 seconds.       ED Course        Procedures               No results found for this or any previous visit (from the past 24 hour(s)).    Medications - No data to display    Assessments & Plan (with Medical Decision Making)   Patient is a 64-year-old male who presents the urgent care with concerns of gout flareup into the left hand.  Inflammation and tenderness to the left second and third joints.  I have no suspicion of a septic joint at this time.  There is no erythema present, afebrile, CMS intact.  We will plan for colchicine and prednisone to treat the gout flareup and instructed patient to keep his established primary care appointment.  Patient is to return with any new or worsening symptoms.  Patient agreeable to plan of care, all questions answered.  Patient discharged in stable condition.  I have reviewed the nursing notes.    I have reviewed the findings, diagnosis, plan and need for follow up with the patient.       Medication List      Modified    colchicine 0.6 MG tablet  Commonly known as:  COLCRYS  Take 2 tablets at the first sign of flare, take 1 additional tablet one hour later.  What changed:  Another medication with the same name was removed. Continue taking this medication, and follow the directions you see here.            Final diagnoses:   Gout       6/27/2019   [facility] EMERGENCY DEPARTMENT     Yudelka Chand APRN CNP  06/27/19 2002

## 2019-08-06 ENCOUNTER — HOSPITAL ENCOUNTER (EMERGENCY)
Facility: CLINIC | Age: 64
Discharge: HOME | End: 2019-08-06
Attending: EMERGENCY MEDICINE | Admitting: EMERGENCY MEDICINE
Payer: COMMERCIAL

## 2019-08-06 VITALS
RESPIRATION RATE: 18 BRPM | TEMPERATURE: 98.1 F | HEART RATE: 75 BPM | SYSTOLIC BLOOD PRESSURE: 180 MMHG | HEIGHT: 72 IN | WEIGHT: 255 LBS | OXYGEN SATURATION: 97 % | BODY MASS INDEX: 34.54 KG/M2 | DIASTOLIC BLOOD PRESSURE: 92 MMHG

## 2019-08-06 DIAGNOSIS — M10.9 ACUTE GOUT OF LEFT WRIST, UNSPECIFIED CAUSE: ICD-10-CM

## 2019-08-06 DIAGNOSIS — R29.898 LEFT HAND WEAKNESS: ICD-10-CM

## 2019-08-06 PROCEDURE — 99283 EMERGENCY DEPT VISIT LOW MDM: CPT

## 2019-08-06 PROCEDURE — 99281 EMR DPT VST MAYX REQ PHY/QHP: CPT

## 2019-08-06 PROCEDURE — 99283 EMERGENCY DEPT VISIT LOW MDM: CPT | Mod: Z6 | Performed by: EMERGENCY MEDICINE

## 2019-08-06 RX ORDER — COLCHICINE 0.6 MG/1
TABLET ORAL
Qty: 8 TABLET | Refills: 0 | Status: SHIPPED | OUTPATIENT
Start: 2019-08-06 | End: 2019-08-12

## 2019-08-06 RX ORDER — PREDNISONE 20 MG/1
TABLET ORAL
Qty: 14 TABLET | Refills: 0 | Status: SHIPPED | OUTPATIENT
Start: 2019-08-06 | End: 2019-08-12

## 2019-08-06 ASSESSMENT — MIFFLIN-ST. JEOR: SCORE: 1989.67

## 2019-08-06 NOTE — ED AVS SNAPSHOT
[facility] Emergency Department  [address] [address]  Phone:  [phone]  Fax:  [phone]                                    Elvin Ware   MRN: [id number]    Department:  [facility] Emergency Department   Date of Visit:  8/6/2019           After Visit Summary Signature Page    I have received my discharge instructions, and my questions have been answered. I have discussed any challenges I see with this plan with the nurse or doctor.    ..........................................................................................................................................  Patient/Patient Representative Signature      ..........................................................................................................................................  Patient Representative Print Name and Relationship to Patient    ..................................................               ................................................  Date                                   Time    ..........................................................................................................................................  Reviewed by Signature/Title    ...................................................              ..............................................  Date                                               Time          22EPIC Rev 08/18

## 2019-08-06 NOTE — ED PROVIDER NOTES
History     Chief Complaint   Patient presents with     Wrist Pain     states had gout last week in hand today with wrist pain and numbness to fingers     HPI  Elvin Ware is a 64 year old male with history of gout with documented elevated uric acids and previous symptoms well responsive to colchicine and prednisone, with right wrist pain and warmth of insidious onset in the past ~ 1 week, which he reports are typical a gout attack. He has had no preceding injury or trauma. Most recent gout flare was approximately 6 weeks ago, involving the right index finger and middle finger MCP joints. The symptoms resolved with treatment with colchicine and prednisone.  Current pain was insidious in onset, is aching, throbbing, radiating proximally into the volar forearm and exacerbated by any movement, and by palpation.  No relief with OTC analgesic. He feels like the left hand is weak and numb, particularly weak in the left little finger. No redness, streaking up the arm, fever or chills.  No other joint or musculoskeletal abnormalities.  No rash.  No other acute systemic symptoms or acute complaints or concerns.     Allergies:  Allergies   Allergen Reactions     Bees      Morphine      Penicillins      Indomethacin Rash       Problem List:    Patient Active Problem List    Diagnosis Date Noted     Gout 12/03/2012     Priority: Medium     Advanced directives, counseling/discussion 12/03/2012     Priority: Medium     Discussed advance care planning with patient; information given to patient to review. 12/3/2012        Tobacco abuse 01/03/2011     Priority: Medium     CARDIOVASCULAR SCREENING; LDL GOAL LESS THAN 130 10/31/2010     Priority: Medium     [place] Risk Score: 30% (19 Total Points)- advised to follow up with PCP soon- may benefit from statin         Cervical Radiculopathy at C6-left 10/06/2008     Priority: Medium     Rib fractures 07/02/2008     Priority: Medium     Fracture of clavicle, closed 07/01/2008      Priority: Medium     left  (Problem list name updated by automated process. Provider to review and confirm.)       Pneumohemothorax 2008     Priority: Medium     bilateral with right worse than left          Past Medical History:    Past Medical History:   Diagnosis Date     Gout        Past Surgical History:    Past Surgical History:   Procedure Laterality Date     C APPENDECTOMY       SURGICAL HISTORY OF -       right knee scope       Family History:    Family History   Problem Relation Age of Onset     Diabetes Mother      Breast Cancer Mother      C.A.D. Mother         MI age 64     Respiratory Father         copd, like, exposure to chemical     Cancer Father         lung cancer     Respiratory Maternal Grandfather         copd     Cerebrovascular Disease Paternal Grandmother      Neurologic Disorder Brother         post polio complications     Cancer - colorectal No family hx of      Prostate Cancer No family hx of        Social History:  Marital Status:   [5]  Social History     Tobacco Use     Smoking status: Current Every Day Smoker     Packs/day: 0.50     Years: 30.00     Pack years: 15.00     Types: Cigarettes     Last attempt to quit: 2008     Years since quittin.1     Smokeless tobacco: Never Used   Substance Use Topics     Alcohol use: Yes     Comment: rare      Drug use: No        Medications:      colchicine (COLCYRS) 0.6 MG tablet   predniSONE (DELTASONE) 20 MG tablet   DM-Doxylamine-Acetaminophen (VICKS NYQUIL MULTI-SYMPTOM PO)       Review of Systems  As mentioned above in the history present illness.  All other systems were reviewed and are negative.    Physical Exam   BP: (!) 199/80  Pulse: 75  Heart Rate: 78  Temp: 98.1  F (36.7  C)  Resp: 16  Height: 182.9 cm (6')  Weight: 115.7 kg (255 lb)  SpO2: 98 %      Physical Exam   Musculoskeletal:        Right wrist: He exhibits decreased range of motion, tenderness and swelling ( Mild volar swelling with warmth but no  erythema.). He exhibits no crepitus and no deformity.        Arms:      ED Course        Procedures                 No results found for this or any previous visit (from the past 24 hour(s)).    Medications - No data to display    Assessments & Plan (with Medical Decision Making)   Patient with history of gout with acute H left wrist arthritis without erythema or suggestion septic joint.  He appears to have an acute gout flare and I doubt septic joint.  No indication for imaging evaluation at present time.  He has previously responded well to colchicine and prednisone, and this is what he desired.  I prescribed these for him, splinted the left wrist and made an orthopedic/sports medicine clinic referral for him due to complaint of hand weakness and numbness which could be representative of a carpal tunnel syndrome.  He was provided instructions for supportive care and will follow-up in primary care clinic in the next week as well.  He will return immediately for new or worsening symptoms.    I have reviewed the nursing notes.    I have reviewed the findings, diagnosis, plan and need for follow up with the patient.       Medication List      Modified    colchicine 0.6 MG tablet  Commonly known as:  COLCYRS  2 tablets for first dose, followed by 1 tablet 1 hour later, then 1 tablet p.o. once daily until symptoms are resolved  What changed:  additional instructions     predniSONE 20 MG tablet  Commonly known as:  DELTASONE  2 tablets p.o. for first dose, then began 1 tablet twice daily for 6 more days, or until symptoms resolved  What changed:  additional instructions        Discontinued    cephALEXin 500 MG capsule  Commonly known as:  KEFLEX            Final diagnoses:   Acute gout of left wrist, unspecified cause   Left hand weakness       8/6/2019   [facility] EMERGENCY DEPARTMENT     Fredrick Bianchi MD  08/08/19 1958

## 2019-08-09 NOTE — PROGRESS NOTES
SUBJECTIVE:                                                    Elvni Ware is 64 year old male   No chief complaint on file.    Symptoms . States . Has tried to little effect.      Problem list and histories reviewed & adjusted, as indicated.  Additional history: as documented    Patient Active Problem List   Diagnosis     Fracture of clavicle, closed     Pneumohemothorax     Rib fractures     Cervical Radiculopathy at C6-left     CARDIOVASCULAR SCREENING; LDL GOAL LESS THAN 130     Tobacco abuse     Gout     Advanced directives, counseling/discussion     Past Surgical History:   Procedure Laterality Date     C APPENDECTOMY       SURGICAL HISTORY OF -       right knee scope       Social History     Tobacco Use     Smoking status: Current Every Day Smoker     Packs/day: 0.50     Years: 30.00     Pack years: 15.00     Types: Cigarettes     Last attempt to quit: 2008     Years since quittin.1     Smokeless tobacco: Never Used   Substance Use Topics     Alcohol use: Yes     Comment: rare      Family History   Problem Relation Age of Onset     Diabetes Mother      Breast Cancer Mother      C.A.D. Mother         MI age 64     Respiratory Father         copd, like, exposure to chemical     Cancer Father         lung cancer     Respiratory Maternal Grandfather         copd     Cerebrovascular Disease Paternal Grandmother      Neurologic Disorder Brother         post polio complications     Cancer - colorectal No family hx of      Prostate Cancer No family hx of          Current Outpatient Medications   Medication Sig Dispense Refill     colchicine (COLCYRS) 0.6 MG tablet 2 tablets for first dose, followed by 1 tablet 1 hour later, then 1 tablet p.o. once daily until symptoms are resolved 8 tablet 0     DM-Doxylamine-Acetaminophen (VICKS NYQUIL MULTI-SYMPTOM PO) Take by mouth as needed       predniSONE (DELTASONE) 20 MG tablet 2 tablets p.o. for first dose, then began 1 tablet twice daily for 6 more days, or  "until symptoms resolved 14 tablet 0     Allergies   Allergen Reactions     Bees      Morphine      Penicillins      Indomethacin Rash     Recent Labs   Lab Test 12/03/12  0912 08/08/12  0031   *  --    HDL 48  --    TRIG 273*  --    ALT 64  --    CR  --  0.75   GFRESTIMATED  --  >90   GFRESTBLACK  --  >90   POTASSIUM  --  3.6      BP Readings from Last 3 Encounters:   08/06/19 (!) 180/92   06/27/19 193/86   07/17/18 148/72    Wt Readings from Last 3 Encounters:   08/06/19 115.7 kg (255 lb)   06/27/19 115.7 kg (255 lb)   05/20/14 112 kg (247 lb)         ROS:  Constitutional, HEENT, cardiovascular, pulmonary, gi and gu systems are negative, except as otherwise noted.    OBJECTIVE:                                                    There were no vitals taken for this visit.  {OTAL:281936}  {Diagnostic Test Results:485068::\"Diagnostic Test Results:\",\"none \"}     ASSESSMENT/PLAN:                                                    {Diag Picklist:135461}    Ania Tanner MD  [facility]    "

## 2019-08-12 ENCOUNTER — OFFICE VISIT (OUTPATIENT)
Dept: FAMILY MEDICINE | Facility: CLINIC | Age: 64
End: 2019-08-12
Payer: COMMERCIAL

## 2019-08-12 ENCOUNTER — OFFICE VISIT (OUTPATIENT)
Dept: ORTHOPEDICS | Facility: CLINIC | Age: 64
End: 2019-08-12
Payer: COMMERCIAL

## 2019-08-12 VITALS
RESPIRATION RATE: 12 BRPM | OXYGEN SATURATION: 93 % | TEMPERATURE: 98.3 F | DIASTOLIC BLOOD PRESSURE: 74 MMHG | HEART RATE: 79 BPM | HEIGHT: 72 IN | BODY MASS INDEX: 38.06 KG/M2 | SYSTOLIC BLOOD PRESSURE: 152 MMHG | WEIGHT: 281 LBS

## 2019-08-12 VITALS
WEIGHT: 281 LBS | SYSTOLIC BLOOD PRESSURE: 136 MMHG | HEIGHT: 72 IN | BODY MASS INDEX: 38.06 KG/M2 | DIASTOLIC BLOOD PRESSURE: 72 MMHG

## 2019-08-12 DIAGNOSIS — M10.9 ACUTE GOUT OF LEFT WRIST, UNSPECIFIED CAUSE: ICD-10-CM

## 2019-08-12 DIAGNOSIS — M25.532 LEFT WRIST PAIN: Primary | ICD-10-CM

## 2019-08-12 DIAGNOSIS — Z87.891 PERSONAL HISTORY OF TOBACCO USE: ICD-10-CM

## 2019-08-12 DIAGNOSIS — I10 HYPERTENSION, UNSPECIFIED TYPE: Primary | ICD-10-CM

## 2019-08-12 DIAGNOSIS — Z11.59 NEED FOR HEPATITIS C SCREENING TEST: ICD-10-CM

## 2019-08-12 DIAGNOSIS — R06.83 SNORES: ICD-10-CM

## 2019-08-12 PROCEDURE — 99203 OFFICE O/P NEW LOW 30 MIN: CPT | Performed by: FAMILY MEDICINE

## 2019-08-12 PROCEDURE — 99214 OFFICE O/P EST MOD 30 MIN: CPT | Performed by: FAMILY MEDICINE

## 2019-08-12 RX ORDER — ALLOPURINOL 100 MG/1
100 TABLET ORAL DAILY
Qty: 30 TABLET | Refills: 11 | Status: SHIPPED | OUTPATIENT
Start: 2019-09-12 | End: 2020-10-01

## 2019-08-12 RX ORDER — COLCHICINE 0.6 MG/1
0.6 TABLET ORAL DAILY
COMMUNITY
End: 2019-11-30

## 2019-08-12 RX ORDER — PREDNISONE 20 MG/1
20 TABLET ORAL DAILY
COMMUNITY
End: 2020-10-13

## 2019-08-12 ASSESSMENT — MIFFLIN-ST. JEOR
SCORE: 2107.61
SCORE: 2107.61

## 2019-08-12 ASSESSMENT — PAIN SCALES - GENERAL: PAINLEVEL: NO PAIN (1)

## 2019-08-12 NOTE — PATIENT INSTRUCTIONS
Diagnosis: Acute Left Hand Pain Attributed to Gout Flare now improved  Image Findings: none  Treatment:   1) Finish prednisone course  2) Start hand/wrist exercises  3) If not improved over the next month Hand therapy ordered   4) Wrist brace as needed over the next couple of weeks  Medications: Limited tylenol/ibuprofen for pain for 1-2 weeks  Follow-up: as needed over the next 1-2 months    [employer] [employer]  Strengthener and Finger Exerciser for Therapy, Rehabilitation, Stress, Arthritis Relief and Extensor Strengthening

## 2019-08-12 NOTE — PROGRESS NOTES
SUBJECTIVE:                                                    Elvin Ware is 64 year old male   Chief Complaint   Patient presents with     ER F/U     ED/UC Followup:    Facility:  [facility] ER  Date of visit: 19  Reason for ERvisit: Acute gout of left wrist  Current Status:  Was prescribed Colcrys  0.6 mg and Prednisone 20 mg. Is scheduled to see Dr. Fernandez in Sports Medicine later today. States that pain and weakness has improved, states that ROM has improved. States that he would like to discuss daily medication for gout, becoming more frequent.          Problem list and histories reviewed & adjusted, as indicated.  Additional history: mother and grandmother had gout.  Rare alcohol, no high fat foods, since wife  not eating as well.    Patient Active Problem List   Diagnosis     Fracture of clavicle, closed     Pneumohemothorax     Rib fractures     Cervical Radiculopathy at C6-left     CARDIOVASCULAR SCREENING; LDL GOAL LESS THAN 130     Tobacco abuse     Gout     Advanced directives, counseling/discussion     Past Surgical History:   Procedure Laterality Date     C APPENDECTOMY       SURGICAL HISTORY OF -       right knee scope       Social History     Tobacco Use     Smoking status: Current Every Day Smoker     Packs/day: 0.50     Years: 30.00     Pack years: 15.00     Types: Cigarettes     Last attempt to quit: 2008     Years since quittin.1     Smokeless tobacco: Never Used   Substance Use Topics     Alcohol use: Yes     Comment: rare      Family History   Problem Relation Age of Onset     Diabetes Mother      Breast Cancer Mother      C.A.D. Mother         MI age 64     Respiratory Father         copd, like, exposure to chemical     Cancer Father         lung cancer     Respiratory Maternal Grandfather         copd     Cerebrovascular Disease Paternal Grandmother      Neurologic Disorder Brother         post polio complications     Cancer - colorectal No family hx of       Prostate Cancer No family hx of          Current Outpatient Medications   Medication Sig Dispense Refill     colchicine (COLCYRS) 0.6 MG tablet Take 0.6 mg by mouth daily       predniSONE (DELTASONE) 20 MG tablet Take 20 mg by mouth daily       Allergies   Allergen Reactions     Bees      Morphine      Penicillins      Indomethacin Rash     Recent Labs   Lab Test 12/03/12  0912 08/08/12  0031   *  --    HDL 48  --    TRIG 273*  --    ALT 64  --    CR  --  0.75   GFRESTIMATED  --  >90   GFRESTBLACK  --  >90   POTASSIUM  --  3.6      BP Readings from Last 3 Encounters:   08/12/19 (!) 152/74   08/06/19 (!) 180/92   06/27/19 193/86    Wt Readings from Last 3 Encounters:   08/12/19 127.5 kg (281 lb)   08/06/19 115.7 kg (255 lb)   06/27/19 115.7 kg (255 lb)         ROS:  Constitutional, HEENT, cardiovascular, pulmonary, gi and gu systems are negative, except as otherwise noted.    OBJECTIVE:                                                    BP (!) 152/74   Pulse 79   Temp 98.3  F (36.8  C) (Tympanic)   Resp 12   Ht 1.829 m (6')   Wt 127.5 kg (281 lb)   SpO2 93%   BMI 38.11 kg/m    GENERAL APPEARANCE ADULT: Alert, no acute distress, obese  MS: extremities normal, no peripheral edema  hand exam Normal hand appearance and range of motion, non-tender, warm red tender left wrist  SKIN: no suspicious lesions or rashes, face red  Diagnostic Test Results:  none      ASSESSMENT/PLAN:                                                    1. Acute gout of left wrist, unspecified cause  Has had many flares and wishes to be on preventive, reviewed lifestyle changes that will help, keeping hydrated to urinate 2 liters a day, avoid saturated fats, etc.  Will wait a month before starting allopurinol as has acute attack right now.  - predniSONE (DELTASONE) 20 MG tablet; Take 20 mg by mouth daily  - colchicine (COLCYRS) 0.6 MG tablet; Take 0.6 mg by mouth daily  - allopurinol (ZYLOPRIM) 100 MG tablet; Take 1 tablet (100 mg) by  mouth daily  Dispense: 30 tablet; Refill: 11  - Lipid panel reflex to direct LDL Fasting; Future    2. Hypertension, unspecified type  In pain today so may not be accurate measure  - Lipid panel reflex to direct LDL Fasting  - Comprehensive metabolic panel  - TSH  - T4 FREE  - Lipid panel reflex to direct LDL Fasting; Future  - TSH; Future  - T4 FREE; Future  - Comprehensive metabolic panel; Future    3. Personal history of tobacco use  Interested in screening for lung cancer  - Prof fee: Shared Decisionmaking for Lung Cancer Screening  - CT Chest Lung Cancer Scrn Low Dose wo; Future    4. Snores  - SLEEP EVALUATION & MANAGEMENT REFERRAL - ADULT -[facility]  [phone] (Age 2 and up); Future    5. Need for hepatitis C screening test  - Hepatitis C antibody; Future  - HIV Antigen Antibody Combo; Future    Ania Tanner MD  [facility]    Lung Cancer Screening Shared Decision Making Visit     Elvin Ware is eligible for lung cancer screening on the basis of the information provided in my signed lung cancer screening order.     I have discussed with patient the risks and benefits of screening for lung cancer with low-dose CT.     The risks include:  radiation exposure: one low dose chest CT has as much ionizing radiation as about 15 chest x-rays or 6 months of background radiation living in [place]    false positives: 96% of positive findings/nodules are NOT cancer, but some might still require additional diagnostic evaluation, including biopsy  over-diagnosis: some slow growing cancers that might never have been clinically significant will be detected and treated unnecessarily     The benefit of early detection of lung cancer is contingent upon adherence to annual screening or more frequent follow up if indicated.     Furthermore, reaping the benefits of screening requires Elvin Ware to be willing and physically able to undergo diagnostic  procedures, if indicated. Although no specific guide is available for determining severity of comorbidities, it is reasonable to withhold screening in patients who have greater mortality risk from other diseases.     We did not discuss that the only way to prevent lung cancer is to not smoke. Smoking cessation assistance was not offered.    I did not offer risk estimation using a calculator such as this one:    ShouldIScreen

## 2019-08-12 NOTE — NURSING NOTE
Initial BP (!) 152/74   Pulse 79   Temp 98.3  F (36.8  C) (Tympanic)   Resp 12   Ht 1.829 m (6')   Wt 127.5 kg (281 lb)   SpO2 93%   BMI 38.11 kg/m   Estimated body mass index is 38.11 kg/m  as calculated from the following:    Height as of this encounter: 1.829 m (6').    Weight as of this encounter: 127.5 kg (281 lb). .

## 2019-08-12 NOTE — LETTER
8/12/2019         RE: Elvin Ware  [address] [facility] [id number]        Dear Colleague,    Thank you for referring your patient, Elvin Ware, to the [facility]. Please see a copy of my visit note below.    ASSESSMENT & PLAN  Elvin was seen today for pain.    Diagnoses and all orders for this visit:    Left wrist pain  -     Cancel: XR Wrist Left G/E 3 Views; Future  -     HAND THERAPY Occupational Therapy or Physical Therapy; Future      Patient is a 64 year old male presenting for evaluation of   Chief Complaint   Patient presents with     Left Wrist - Pain      # Acute Left Hand/Wrist Pain: Notable swelling following gout flare up sig improved on PO prednisone.  Pt planning to be started on allopurinol given mult flares over hte past year.  Sx inclduing pain, ROM and strength all improved.  No sig pain on today's exam.  N/T likely irritation of median/ulnar nerve with swelling.  Counseled patient on nature of condition and treatment options.  Plan as below f/u PRN  Treatment: relative rest  Physical Therapy HEP including hand/wrist strengthening  Injection none  Medications  Limited NSAIDs/Tylenol    Concerning signs/sx that would warrant urgent evaluation were discussed.  All questions were answered, patient understands and agrees with plan.      Return if symptoms worsen or fail to improve.    -----    SUBJECTIVE  Elvin Ware is a/an 64 year old Right handed male who is seen as an ER referral for evaluation of left wrist pain. The patient is seen by themselves.    Onset: 1.5 week(s) ago. Reports insidious onset without acute precipitating event. Presented to ED 8/6/19. Has reported HO gout.  Pt was seen in UC was told he may have CTS improved with brace.  Pt reports significant improvement.  Pt has gotten gout in hand improved with steroids, can get it in feet, knees can move around does have meds for it to control flare ups given increased frequency  (5 times in the past year).  Location of Pain: left volar wrist, occasionally radiating to elbow and fingers   Rating of Pain at worst: 8/10  Rating of Pain Currently: 1/10  Worsened by: gripping, some weakness  Better with: Prednisone, Ice  Treatments tried: elevation, ice and brace   Associated symptoms: swelling and numbness  Orthopedic history: YES - gout   Relevant surgical history: NO  Patient Social History: works at [employer]    Patient's past medical, surgical, social, and family histories were reviewed today and no changes are noted.    REVIEW OF SYSTEMS:  10 point ROS is negative other than symptoms noted above in HPI, Past Medical History or as stated below  Constitutional: NEGATIVE for fever, chills, change in weight  Skin: NEGATIVE for worrisome rashes, moles or lesions  GI/: NEGATIVE for bowel or bladder changes  Neuro: NEGATIVE for weakness, dizziness or paresthesias    OBJECTIVE:  /72   Ht 1.829 m (6')   Wt 127.5 kg (281 lb)   BMI 38.11 kg/m      General: healthy, alert and in no distress  HEENT: no scleral icterus or conjunctival erythema  Skin: no suspicious lesions or rash. No jaundice.  CV: regular rhythm by palpation  Resp: normal respiratory effort without conversational dyspnea   Psych: normal mood and affect  Gait: normal steady gait with appropriate coordination and balance  Neuro: Normal sensory exam of bilateral hands. Normal 2 pt discrimination.   MSK:  LEFT WRIST  Inspection:    No swelling or obvious deformity or asymmetry, healed scar over palmar surface  Palpation:    Nontender.     Metacarpals: 4th metacarpal, 5th metacarpal heads    Thumb: normal    Fingers: normal  Range of Motion:    Full (active and passive) flexion, extension, pronation/supination, and ulnar/radial deviation.  Strength:     strength decreased 2/2 weakness flexion limited slightly by pain extension full radial deviation full ulnar deviation full. Normal pinch strength.  Special Tests:     Positive: None    Negative: Phalen's, Tinel's (carpal tunnel), Tinel's (cubital tunnel), Finkelstein's, CMC grind, thenar eminence wasting, hypothenar eminence wasting.     Independent visualization of the below image:  No results found for this or any previous visit (from the past 24 hour(s)).      Patient's conditions were thoroughly discussed during today's visit with greater than 50% of the visit spent counseling the patient with total time spent face-to-face with the patient being 30 minutes.    Steven Fernandez MD [facility]        Again, thank you for allowing me to participate in the care of your patient.        Sincerely,        Steven Fernandez MD

## 2019-08-12 NOTE — PROGRESS NOTES
ASSESSMENT & PLAN  Elvin was seen today for pain.    Diagnoses and all orders for this visit:    Left wrist pain  -     Cancel: XR Wrist Left G/E 3 Views; Future  -     HAND THERAPY Occupational Therapy or Physical Therapy; Future      Patient is a 64 year old male presenting for evaluation of   Chief Complaint   Patient presents with     Left Wrist - Pain      # Acute Left Hand/Wrist Pain: Notable swelling following gout flare up sig improved on PO prednisone.  Pt planning to be started on allopurinol given mult flares over hte past year.  Sx inclduing pain, ROM and strength all improved.  No sig pain on today's exam.  N/T likely irritation of median/ulnar nerve with swelling.  Counseled patient on nature of condition and treatment options.  Plan as below f/u PRN  Treatment: relative rest  Physical Therapy HEP including hand/wrist strengthening  Injection none  Medications  Limited NSAIDs/Tylenol    Concerning signs/sx that would warrant urgent evaluation were discussed.  All questions were answered, patient understands and agrees with plan.      Return if symptoms worsen or fail to improve.    -----    SUBJECTIVE  Elvin YOEL Ware is a/an 64 year old Right handed male who is seen as an ER referral for evaluation of left wrist pain. The patient is seen by themselves.    Onset: 1.5 week(s) ago. Reports insidious onset without acute precipitating event. Presented to ED 8/6/19. Has reported HO gout.  Pt was seen in UC was told he may have CTS improved with brace.  Pt reports significant improvement.  Pt has gotten gout in hand improved with steroids, can get it in feet, knees can move around does have meds for it to control flare ups given increased frequency (5 times in the past year).  Location of Pain: left volar wrist, occasionally radiating to elbow and fingers   Rating of Pain at worst: 8/10  Rating of Pain Currently: 1/10  Worsened by: gripping, some weakness  Better with: Prednisone, Ice  Treatments tried:  elevation, ice and brace   Associated symptoms: swelling and numbness  Orthopedic history: YES - gout   Relevant surgical history: NO  Patient Social History: works at [employer] job    Patient's past medical, surgical, social, and family histories were reviewed today and no changes are noted.    REVIEW OF SYSTEMS:  10 point ROS is negative other than symptoms noted above in HPI, Past Medical History or as stated below  Constitutional: NEGATIVE for fever, chills, change in weight  Skin: NEGATIVE for worrisome rashes, moles or lesions  GI/: NEGATIVE for bowel or bladder changes  Neuro: NEGATIVE for weakness, dizziness or paresthesias    OBJECTIVE:  /72   Ht 1.829 m (6')   Wt 127.5 kg (281 lb)   BMI 38.11 kg/m     General: healthy, alert and in no distress  HEENT: no scleral icterus or conjunctival erythema  Skin: no suspicious lesions or rash. No jaundice.  CV: regular rhythm by palpation  Resp: normal respiratory effort without conversational dyspnea   Psych: normal mood and affect  Gait: normal steady gait with appropriate coordination and balance  Neuro: Normal sensory exam of bilateral hands. Normal 2 pt discrimination.   MSK:  LEFT WRIST  Inspection:    No swelling or obvious deformity or asymmetry, healed scar over palmar surface  Palpation:    Nontender.     Metacarpals: 4th metacarpal, 5th metacarpal heads    Thumb: normal    Fingers: normal  Range of Motion:    Full (active and passive) flexion, extension, pronation/supination, and ulnar/radial deviation.  Strength:     strength decreased 2/2 weakness flexion limited slightly by pain extension full radial deviation full ulnar deviation full. Normal pinch strength.  Special Tests:    Positive: None    Negative: Phalen's, Tinel's (carpal tunnel), Tinel's (cubital tunnel), Finkelstein's, CMC grind, thenar eminence wasting, hypothenar eminence wasting.     Independent visualization of the below image:  No results found for this or any previous  visit (from the past 24 hour(s)).      Patient's conditions were thoroughly discussed during today's visit with greater than 50% of the visit spent counseling the patient with total time spent face-to-face with the patient being 30 minutes.    Steven Fernandez MD [facility]

## 2019-08-12 NOTE — PATIENT INSTRUCTIONS

## 2019-08-19 ENCOUNTER — HOSPITAL ENCOUNTER (OUTPATIENT)
Dept: CT IMAGING | Facility: CLINIC | Age: 64
Discharge: HOME | End: 2019-08-19
Attending: FAMILY MEDICINE | Admitting: FAMILY MEDICINE
Payer: COMMERCIAL

## 2019-08-19 DIAGNOSIS — Z11.59 NEED FOR HEPATITIS C SCREENING TEST: ICD-10-CM

## 2019-08-19 DIAGNOSIS — I10 HYPERTENSION, UNSPECIFIED TYPE: ICD-10-CM

## 2019-08-19 DIAGNOSIS — Z87.891 PERSONAL HISTORY OF TOBACCO USE: ICD-10-CM

## 2019-08-19 DIAGNOSIS — R73.09 ELEVATED GLUCOSE: Primary | ICD-10-CM

## 2019-08-19 LAB
ALBUMIN SERPL-MCNC: 3.5 G/DL (ref 3.4–5)
ALP SERPL-CCNC: 68 U/L (ref 40–150)
ALT SERPL W P-5'-P-CCNC: 23 U/L (ref 0–70)
ANION GAP SERPL CALCULATED.3IONS-SCNC: 6 MMOL/L (ref 3–14)
AST SERPL W P-5'-P-CCNC: 13 U/L (ref 0–45)
BILIRUB SERPL-MCNC: 0.5 MG/DL (ref 0.2–1.3)
BUN SERPL-MCNC: 12 MG/DL (ref 7–30)
CALCIUM SERPL-MCNC: 8.7 MG/DL (ref 8.5–10.1)
CHLORIDE SERPL-SCNC: 102 MMOL/L (ref 94–109)
CHOLEST SERPL-MCNC: 255 MG/DL
CO2 SERPL-SCNC: 30 MMOL/L (ref 20–32)
CREAT SERPL-MCNC: 0.71 MG/DL (ref 0.66–1.25)
GFR SERPL CREATININE-BSD FRML MDRD: >90 ML/MIN/{1.73_M2}
GLUCOSE SERPL-MCNC: 147 MG/DL (ref 70–99)
HCV AB SERPL QL IA: NONREACTIVE
HDLC SERPL-MCNC: 39 MG/DL
HIV 1+2 AB+HIV1 P24 AG SERPL QL IA: NONREACTIVE
LDLC SERPL CALC-MCNC: ABNORMAL MG/DL
LDLC SERPL DIRECT ASSAY-MCNC: 150 MG/DL
NONHDLC SERPL-MCNC: 216 MG/DL
POTASSIUM SERPL-SCNC: 3.7 MMOL/L (ref 3.4–5.3)
PROT SERPL-MCNC: 7.2 G/DL (ref 6.8–8.8)
SODIUM SERPL-SCNC: 138 MMOL/L (ref 133–144)
T4 FREE SERPL-MCNC: 0.84 NG/DL (ref 0.76–1.46)
TRIGL SERPL-MCNC: 572 MG/DL
TSH SERPL DL<=0.005 MIU/L-ACNC: 1.51 MU/L (ref 0.4–4)

## 2019-08-19 PROCEDURE — 84439 ASSAY OF FREE THYROXINE: CPT | Performed by: FAMILY MEDICINE

## 2019-08-19 PROCEDURE — 87389 HIV-1 AG W/HIV-1&-2 AB AG IA: CPT | Performed by: FAMILY MEDICINE

## 2019-08-19 PROCEDURE — 80053 COMPREHEN METABOLIC PANEL: CPT | Performed by: FAMILY MEDICINE

## 2019-08-19 PROCEDURE — 36415 COLL VENOUS BLD VENIPUNCTURE: CPT | Performed by: FAMILY MEDICINE

## 2019-08-19 PROCEDURE — 84443 ASSAY THYROID STIM HORMONE: CPT | Performed by: FAMILY MEDICINE

## 2019-08-19 PROCEDURE — 80061 LIPID PANEL: CPT | Performed by: FAMILY MEDICINE

## 2019-08-19 PROCEDURE — G0297 LDCT FOR LUNG CA SCREEN: HCPCS

## 2019-08-19 PROCEDURE — 83721 ASSAY OF BLOOD LIPOPROTEIN: CPT | Mod: 59 | Performed by: FAMILY MEDICINE

## 2019-08-19 PROCEDURE — 86803 HEPATITIS C AB TEST: CPT | Performed by: FAMILY MEDICINE

## 2019-08-28 PROBLEM — I10 HYPERTENSION, UNSPECIFIED TYPE: Status: ACTIVE | Noted: 2019-08-28

## 2019-11-30 ENCOUNTER — OFFICE VISIT (OUTPATIENT)
Dept: URGENT CARE | Facility: URGENT CARE | Age: 64
End: 2019-11-30
Payer: COMMERCIAL

## 2019-11-30 VITALS
BODY MASS INDEX: 36.76 KG/M2 | OXYGEN SATURATION: 96 % | SYSTOLIC BLOOD PRESSURE: 152 MMHG | HEIGHT: 72 IN | TEMPERATURE: 96.9 F | WEIGHT: 271.4 LBS | DIASTOLIC BLOOD PRESSURE: 74 MMHG | RESPIRATION RATE: 16 BRPM | HEART RATE: 66 BPM

## 2019-11-30 DIAGNOSIS — M10.9 ACUTE GOUT OF RIGHT HAND, UNSPECIFIED CAUSE: Primary | ICD-10-CM

## 2019-11-30 PROCEDURE — 99213 OFFICE O/P EST LOW 20 MIN: CPT | Performed by: FAMILY MEDICINE

## 2019-11-30 RX ORDER — PREDNISONE 20 MG/1
40 TABLET ORAL DAILY
Qty: 10 TABLET | Refills: 0 | Status: SHIPPED | OUTPATIENT
Start: 2019-11-30 | End: 2019-11-30

## 2019-11-30 RX ORDER — COLCHICINE 0.6 MG/1
0.6 TABLET ORAL DAILY
Qty: 15 TABLET | Refills: 0 | Status: SHIPPED | OUTPATIENT
Start: 2019-11-30 | End: 2020-10-13

## 2019-11-30 RX ORDER — COLCHICINE 0.6 MG/1
0.6 TABLET ORAL DAILY
Qty: 15 TABLET | Refills: 0 | Status: SHIPPED | OUTPATIENT
Start: 2019-11-30 | End: 2019-11-30

## 2019-11-30 RX ORDER — PREDNISONE 20 MG/1
40 TABLET ORAL DAILY
Qty: 10 TABLET | Refills: 0 | Status: SHIPPED | OUTPATIENT
Start: 2019-11-30 | End: 2020-10-13

## 2019-11-30 ASSESSMENT — MIFFLIN-ST. JEOR: SCORE: 2064.06

## 2019-11-30 ASSESSMENT — PAIN SCALES - GENERAL: PAINLEVEL: SEVERE PAIN (6)

## 2019-11-30 NOTE — PATIENT INSTRUCTIONS
Patient Education     Gout Diet  Gout is a painful condition caused by an excess of uric acid, a waste product made by the body. Uric acid forms crystals that collect in the joints. The immune response to these crystals brings on symptoms of joint pain and swelling. This is called a gout attack. Often, medications and diet changes are combined to manage gout. Below are some guidelines for changing your diet to help you manage gout and prevent attacks. Your healthcare provider will help you determine the best eating plan for you.  Eating to manage gout  Weight loss for those who are overweight may help reduce gout attacks.  Eat less of these foods  Eating too many foods containing purines may raise the levels of uric acid in your body. This raises your risk for a gout attack. Try to limit these foods and drinks:    Alcohol, such as beer and red wine. You may be told to avoid alcohol completely.    Soft drinks that contain sugar or high fructose corn syrup    Certain fish, including anchovies, sardines, fish eggs, and herring    Shellfish    Certain meats, such as red meat, hot dogs, luncheon meats, and turkey    Organ meats, such as liver, kidneys, and sweetbreads    Legumes, such as dried beans and peas    Other high fat foods such as gravy, whole milk, and high fat cheeses    Vegetables such as asparagus, cauliflower, spinach, and mushrooms used to be thought to contribute to an increased risk for a gout attack, but recent studies show that high purine vegetables don't increase the risk for a gout attack.  Eat more of these foods  Other foods may be helpful for people with gout. Add some of these foods to your diet:    Cherries contain chemicals that may lower uric acid.    Omega fatty acids. These are found in some fatty fish such as salmon, certain oils (flax, olive, or nut), and nuts themselves. Omega fatty acids may help prevent inflammation due to gout.    Dairy products that are low-fat or fat-free, such as  cheese and yogurt    Complex carbohydrate foods, including whole grains, brown rice, oats, and beans    Coffee, in moderation    Water, approximately 64 ounces per day  Follow-up care  Follow up with your healthcare provider as advised.  When to seek medical advice  Call your healthcare provider right away if any of these occur:    Return of gout symptoms, usually at night:    Severe pain, swelling, and heat in a joint, especially the base of the big toe    Affected joint is hard to move    Skin of the affected joint is purple or red    Fever of 100.4 F (38 C) or higher    Pain that doesn't get better even with prescribed medicine   Date Last Reviewed: 6/1/2018 2000-2018 [employer]. [address] [place] [address], [place] [address]. All rights reserved. This information is not intended as a substitute for professional medical care. Always follow your healthcare professional's instructions.

## 2019-11-30 NOTE — PROGRESS NOTES
preSUBJECTIVE:   Elvin Ware is a 64 year old male presenting with a chief complaint of   Chief Complaint   Patient presents with     Arthritis     2 days Right hand started to hurt, swollen. Taking allopurinol and tylenol.       He is an established patient of [facility].    MS Injury/Pain    Onset of symptoms was 2 day(s) ago.  Location: right hand  Context:   Patient comes in for an acute attack of gout- has had gout for many years ,started using Allopurinol in September  Patient experienced immediate pain  Course of symptoms is worsening.    Severity moderate  Current and Associated symptoms: Pain, Swelling, Warmth, Redness and Tenderness  Denies  Decreased range of motion  Aggravating Factors: movement  Therapies to improve symptoms include: ibuprofen  This is not the first time this type of problem has occurred for this patient.     Patient is 64 yr old male here for right hand pain.Says he has had gout in the past and he has had it in the gout in the right hand - pain is quite severe. He started using allopurinol in Sept . He says it has helped a lot as he used to have up to three gout attacks in a month.  He denies any trauma to the hand. Has had no systemic symptoms.     Review of Systems   Constitutional: Negative.    HENT: Negative.    Eyes: Negative.    Respiratory: Negative.    Cardiovascular: Negative.    Gastrointestinal: Negative.    Endocrine: Negative.    Genitourinary: Negative.    Musculoskeletal: Positive for joint swelling.   Skin: Negative.    Allergic/Immunologic: Negative.    Neurological: Negative.    Hematological: Negative.    Psychiatric/Behavioral: Negative.        Past Medical History:   Diagnosis Date     Gout     history of gout     Family History   Problem Relation Age of Onset     Diabetes Mother      Breast Cancer Mother      C.A.D. Mother         MI age 64     Gout Mother      Respiratory Father         copd, like, exposure to chemical     Cancer Father         lung cancer      Respiratory Maternal Grandfather         copd     Cerebrovascular Disease Paternal Grandmother      Neurologic Disorder Brother         post polio complications     Cancer - colorectal No family hx of      Prostate Cancer No family hx of      Current Outpatient Medications   Medication Sig Dispense Refill     allopurinol (ZYLOPRIM) 100 MG tablet Take 1 tablet (100 mg) by mouth daily 30 tablet 11     colchicine (COLCYRS) 0.6 MG tablet Take 1 tablet (0.6 mg) by mouth daily 15 tablet 0     predniSONE (DELTASONE) 20 MG tablet Take 2 tablets (40 mg) by mouth daily 10 tablet 0     predniSONE (DELTASONE) 20 MG tablet Take 20 mg by mouth daily       Social History     Tobacco Use     Smoking status: Current Every Day Smoker     Packs/day: 0.50     Years: 30.00     Pack years: 15.00     Types: Cigarettes     Last attempt to quit: 2008     Years since quittin.4     Smokeless tobacco: Never Used   Substance Use Topics     Alcohol use: Yes     Comment: rare        OBJECTIVE  BP (!) 152/74 (BP Location: Right arm, Patient Position: Sitting, Cuff Size: Adult Large)   Pulse 66   Temp 96.9  F (36.1  C) (Tympanic)   Resp 16   Ht 1.829 m (6')   Wt 123.1 kg (271 lb 6.4 oz)   SpO2 96%   BMI 36.81 kg/m      Physical Exam  Constitutional:       Appearance: Normal appearance.   HENT:      Head: Normocephalic and atraumatic.      Right Ear: Tympanic membrane normal.      Left Ear: Tympanic membrane normal.   Eyes:      Pupils: Pupils are equal, round, and reactive to light.   Cardiovascular:      Rate and Rhythm: Normal rate and regular rhythm.      Pulses: Normal pulses.      Heart sounds: Normal heart sounds.   Musculoskeletal:         General: Swelling present.      Right wrist: He exhibits tenderness and swelling.      Comments: Right hand - swelling , warmth on dorsum of hand   Skin:     General: Skin is warm.      Findings: Erythema present.   Neurological:      Mental Status: He is alert.         Labs:  No results  found for this or any previous visit (from the past 24 hour(s)).    X-Ray was not done.    ASSESSMENT:      ICD-10-CM    1. Acute gout of right hand, unspecified cause M10.9 predniSONE (DELTASONE) 20 MG tablet     colchicine (COLCYRS) 0.6 MG tablet     DISCONTINUED: predniSONE (DELTASONE) 20 MG tablet     DISCONTINUED: colchicine (COLCYRS) 0.6 MG tablet            Differential Diagnosis:  MS Pain: Gout    Serious Comorbid Conditions:  Adult:  None    PLAN:    MS Injury/Pain  Rx: prednisone and colchicine, asked to hold of on taking Allopurinol while treating the acute gout attack.     Followup:    If not improving or if condition worsens, follow up with your Primary Care Provider    Patient Instructions     Patient Education     Gout Diet  Gout is a painful condition caused by an excess of uric acid, a waste product made by the body. Uric acid forms crystals that collect in the joints. The immune response to these crystals brings on symptoms of joint pain and swelling. This is called a gout attack. Often, medications and diet changes are combined to manage gout. Below are some guidelines for changing your diet to help you manage gout and prevent attacks. Your healthcare provider will help you determine the best eating plan for you.  Eating to manage gout  Weight loss for those who are overweight may help reduce gout attacks.  Eat less of these foods  Eating too many foods containing purines may raise the levels of uric acid in your body. This raises your risk for a gout attack. Try to limit these foods and drinks:    Alcohol, such as beer and red wine. You may be told to avoid alcohol completely.    Soft drinks that contain sugar or high fructose corn syrup    Certain fish, including anchovies, sardines, fish eggs, and herring    Shellfish    Certain meats, such as red meat, hot dogs, luncheon meats, and turkey    Organ meats, such as liver, kidneys, and sweetbreads    Legumes, such as dried beans and peas    Other  high fat foods such as gravy, whole milk, and high fat cheeses    Vegetables such as asparagus, cauliflower, spinach, and mushrooms used to be thought to contribute to an increased risk for a gout attack, but recent studies show that high purine vegetables don't increase the risk for a gout attack.  Eat more of these foods  Other foods may be helpful for people with gout. Add some of these foods to your diet:    Cherries contain chemicals that may lower uric acid.    Omega fatty acids. These are found in some fatty fish such as salmon, certain oils (flax, olive, or nut), and nuts themselves. Omega fatty acids may help prevent inflammation due to gout.    Dairy products that are low-fat or fat-free, such as cheese and yogurt    Complex carbohydrate foods, including whole grains, brown rice, oats, and beans    Coffee, in moderation    Water, approximately 64 ounces per day  Follow-up care  Follow up with your healthcare provider as advised.  When to seek medical advice  Call your healthcare provider right away if any of these occur:    Return of gout symptoms, usually at night:    Severe pain, swelling, and heat in a joint, especially the base of the big toe    Affected joint is hard to move    Skin of the affected joint is purple or red    Fever of 100.4 F (38 C) or higher    Pain that doesn't get better even with prescribed medicine   Date Last Reviewed: 6/1/2018 2000-2018 [employer]. [address] [place] [id number]. All rights reserved. This information is not intended as a substitute for professional medical care. Always follow your healthcare professional's instructions.

## 2019-12-01 ASSESSMENT — ENCOUNTER SYMPTOMS
CARDIOVASCULAR NEGATIVE: 1
ALLERGIC/IMMUNOLOGIC NEGATIVE: 1
NEUROLOGICAL NEGATIVE: 1
HEMATOLOGIC/LYMPHATIC NEGATIVE: 1
RESPIRATORY NEGATIVE: 1
EYES NEGATIVE: 1
PSYCHIATRIC NEGATIVE: 1
CONSTITUTIONAL NEGATIVE: 1
GASTROINTESTINAL NEGATIVE: 1
JOINT SWELLING: 1
ENDOCRINE NEGATIVE: 1

## 2020-03-13 ENCOUNTER — OFFICE VISIT (OUTPATIENT)
Dept: FAMILY MEDICINE | Facility: CLINIC | Age: 65
End: 2020-03-13
Payer: COMMERCIAL

## 2020-03-13 VITALS
SYSTOLIC BLOOD PRESSURE: 142 MMHG | TEMPERATURE: 98 F | BODY MASS INDEX: 37.16 KG/M2 | HEART RATE: 68 BPM | WEIGHT: 274 LBS | DIASTOLIC BLOOD PRESSURE: 78 MMHG | RESPIRATION RATE: 16 BRPM

## 2020-03-13 DIAGNOSIS — L72.3 INFECTED SEBACEOUS CYST OF SKIN: Primary | ICD-10-CM

## 2020-03-13 DIAGNOSIS — L91.8 SKIN TAG: ICD-10-CM

## 2020-03-13 DIAGNOSIS — L08.9 INFECTED SEBACEOUS CYST OF SKIN: Primary | ICD-10-CM

## 2020-03-13 DIAGNOSIS — L57.0 ACTINIC KERATOSIS: ICD-10-CM

## 2020-03-13 PROCEDURE — 11200 RMVL SKIN TAGS UP TO&INC 15: CPT | Mod: 59 | Performed by: FAMILY MEDICINE

## 2020-03-13 PROCEDURE — 17000 DESTRUCT PREMALG LESION: CPT | Mod: 59 | Performed by: FAMILY MEDICINE

## 2020-03-13 PROCEDURE — 11402 EXC TR-EXT B9+MARG 1.1-2 CM: CPT | Performed by: FAMILY MEDICINE

## 2020-03-13 RX ORDER — CEPHALEXIN 500 MG/1
500 CAPSULE ORAL 3 TIMES DAILY
Qty: 15 CAPSULE | Refills: 0 | Status: SHIPPED | OUTPATIENT
Start: 2020-03-13 | End: 2020-03-18

## 2020-03-13 NOTE — NURSING NOTE
Chief Complaint   Patient presents with     Derm Problem     Left shoulder.  Has had for 7 months, and just started to bug yesterday.        Initial BP (!) 142/78 (BP Location: Right arm, Patient Position: Chair, Cuff Size: Adult Large)   Pulse 68   Temp 98  F (36.7  C) (Tympanic)   Resp 16   Wt 124.3 kg (274 lb)   BMI 37.16 kg/m   Estimated body mass index is 37.16 kg/m  as calculated from the following:    Height as of 11/30/19: 1.829 m (6').    Weight as of this encounter: 124.3 kg (274 lb).    Patient presents to the clinic using No DME    Health Maintenance that is potentially due pending provider review:  colonscopy- patient stated would do.  Would also consider pneumonia and shingles.     n/a    Is there anyone who you would like to be able to receive your results? No  If yes have patient fill out DRISS Baldwin M.A.

## 2020-03-13 NOTE — PROGRESS NOTES
SUBJECTIVE:  Elvin Ware is a 64 year old male who presents to the clinic today lump lt upper back shoulder blade area for 4 months, but tender and increasing size over the last week.  No fever.  Also lesion or lt malar area below eye and skin tag on lt shoulder.    Associated symptoms include: nothing.    Past Medical History:   Diagnosis Date     Gout     history of gout     Current Outpatient Medications   Medication Sig Dispense Refill     allopurinol (ZYLOPRIM) 100 MG tablet Take 1 tablet (100 mg) by mouth daily 30 tablet 11     cephALEXin (KEFLEX) 500 MG capsule Take 1 capsule (500 mg) by mouth 3 times daily for 5 days 15 capsule 0     colchicine (COLCYRS) 0.6 MG tablet Take 1 tablet (0.6 mg) by mouth daily (Patient not taking: Reported on 3/13/2020) 15 tablet 0     predniSONE (DELTASONE) 20 MG tablet Take 2 tablets (40 mg) by mouth daily (Patient not taking: Reported on 3/13/2020) 10 tablet 0     predniSONE (DELTASONE) 20 MG tablet Take 20 mg by mouth daily       History   Smoking Status     Current Every Day Smoker     Packs/day: 0.50     Years: 30.00     Types: Cigarettes     Last attempt to quit: 6/21/2008   Smokeless Tobacco     Never Used       ROS:  Review of systems negative except as stated above.    EXAM:   BP (!) 142/78 (BP Location: Right arm, Patient Position: Chair, Cuff Size: Adult Large)   Pulse 68   Temp 98  F (36.7  C) (Tympanic)   Resp 16   Wt 124.3 kg (274 lb)   BMI 37.16 kg/m    GENERAL: alert, no acute distress.  SKIN:  Keratotic lesion lt cheek 3 mm diameter.  Skin tag lt shoulder.  Tender red lump on lt shoulder blade 2 cm diameter with surrounding redness  ASSESSMENT:  1) infected sebaceous cyst lt upper back.  2) keratosis lt cheek  3) skin tag lt shoulder    PLAN: Procedure #1 area of lt upper back prepped with betadine and sterile drape.  Injected 3 ml of xylocaine with epi.  Cruciate incision was made with #11 blade.  Sebaceous and purulent material was expressed.   Removed most of capsule with iris and pickup.  Irrigated with sterile saline.  Packed with 3 cm of Iodine guaze.  Partially closed with 4 interrupted 5-0 chromic,  Covered with 3cm X 3cm Tegaderm.  Instructions:  Remove tegaderm and packing in two days.  Cleanse and redress with large bandaide q day. See pcp if increasing pain , fever, or drainage.  Procedure #2 Keratosis on face frozen three times.  Skin tag of shoulder frozen three times.  Orders Placed This Encounter   Procedures     GASTROENTEROLOGY ADULT REF PROCEDURE ONLY [facility] [phone]; No Provider Preference     Follow up with primary care provider if no improvement.

## 2020-03-13 NOTE — PATIENT INSTRUCTIONS
Remove dressing and guaze packing in two days    Apply antibiotic ointment and large bandaid each day until closed    Antibiotic for five days    Recheck if increasing pain, drainage, or fever.  Patient Education     Laceration: Infected Repair  You have a laceration, or a cut through the skin. It has been closed with stitches. But the cut has become infected. Infection is more likely to occur if:    The wound was deep or a puncture wound. These are more likely to get infected than shallow or wide-open wounds that are easier to clean.    Dirt or particles were deep into the wound at the time of injury    You have diabetes, HIV infection, or other problem that affects your immune system    You are taking medicine that affects your immune system    Home care of the laceration was not followed carefully  Treatment of an infected repair may require removal of some or all of the stitches. You may need to take oral antibiotic medicine to treat the infection.  Home care    If antibiotics have been prescribed, take them exactly as directed. Don't stop taking them until they are gone or you are told to stop, even if you feel better.     Follow the healthcare provider s instructions on how to care for the cut.    Unless otherwise instructed, change the bandage twice a day for the first few days, until the drainage stops. Then change it once a day. Change the bandage if it becomes wet, stained with wound fluid, or dirty.  Clean the wound daily:    After removing the bandage, gently wash the area with soap and water. Use a wet cotton swab to loosen and remove any blood or crust that forms.    After cleaning, apply a thin layer of over-the-counter antibiotic ointment if advised. Reapply a fresh bandage.    Follow the healthcare provider's instructions for keeping the wound dry. You may be given restrictions on showering or tub baths.    If the bandage gets wet, remove it. Gently pat the wound dry with a clean cloth, then replace  the wet bandage with a dry one.    Don't scratch, rub, or pick at the area.    Wash your hands with soap and warm water before and after cleaning the wound or changing the bandage.  Follow-up care  Follow up with your healthcare provider, or as advised. It is important to follow up to make sure the infection is improving.  When to seek medical advice  Call your healthcare provider right away if any of these occur:    Symptoms don't begin to improve    Wound pain, redness, or swelling increases    Red streaks spread from the wound    Increase in pus coming from the wound    Fever of 100.4 F (38 C) or higher, or as directed by your healthcare provider  Date Last Reviewed: 7/1/2017 2000-2019 [employer]. [address] [place] [address], [place] [address]. All rights reserved. This information is not intended as a substitute for professional medical care. Always follow your healthcare professional's instructions.

## 2020-06-10 ENCOUNTER — HOSPITAL ENCOUNTER (OUTPATIENT)
Facility: CLINIC | Age: 65
End: 2020-06-10
Attending: SURGERY | Admitting: SURGERY
Payer: COMMERCIAL

## 2020-06-10 DIAGNOSIS — Z11.59 ENCOUNTER FOR SCREENING FOR OTHER VIRAL DISEASES: Primary | ICD-10-CM

## 2020-06-18 ENCOUNTER — ANESTHESIA EVENT (OUTPATIENT)
Dept: GASTROENTEROLOGY | Facility: CLINIC | Age: 65
End: 2020-06-18

## 2020-06-18 ASSESSMENT — LIFESTYLE VARIABLES: TOBACCO_USE: 1

## 2020-06-18 NOTE — ANESTHESIA PREPROCEDURE EVALUATION
Anesthesia Pre-Procedure Evaluation    Patient: Elvin Wrae   MRN: [id number] : 1955          Preoperative Diagnosis: Colon cancer screening [Z12.11]    Procedure(s):  COLONOSCOPY    Past Medical History:   Diagnosis Date     Gout     history of gout     Past Surgical History:   Procedure Laterality Date     C APPENDECTOMY       SURGICAL HISTORY OF -       right knee scope       Anesthesia Evaluation     .             ROS/MED HX    ENT/Pulmonary: Comment: Hx of pneumothorax    (+)tobacco use, Current use , . .    Neurologic:       Cardiovascular:     (+) hypertension----. : . . . :. .       METS/Exercise Tolerance:     Hematologic:         Musculoskeletal: Comment: Gout  Hx of rib fractures  Cervical radiculopathy--C^        GI/Hepatic:         Renal/Genitourinary:         Endo:         Psychiatric:         Infectious Disease:         Malignancy:         Other:                                 Lab Results   Component Value Date    WBC 9.7 2018    HGB 14.0 2018    HCT 42.2 2018     2018    SED 21 (H) 2018     2019    POTASSIUM 3.7 2019    CHLORIDE 102 2019    CO2 30 2019    BUN 12 2019    CR 0.71 2019     (H) 2019    TRAE 8.7 2019    ALBUMIN 3.5 2019    PROTTOTAL 7.2 2019    ALT 23 2019    AST 13 2019    ALKPHOS 68 2019    BILITOTAL 0.5 2019    TSH 1.51 2019    T4 0.84 2019       Preop Vitals  BP Readings from Last 3 Encounters:   20 (!) 142/78   19 (!) 152/74   19 136/72    Pulse Readings from Last 3 Encounters:   20 68   19 66   19 79      Resp Readings from Last 3 Encounters:   20 16   19 16   19 12    SpO2 Readings from Last 3 Encounters:   19 96%   19 93%   19 97%      Temp Readings from Last 1 Encounters:   20 36.7  C (98  F) (Tympanic)    Ht Readings from Last 1 Encounters:    11/30/19 1.829 m (6')      Wt Readings from Last 1 Encounters:   03/13/20 124.3 kg (274 lb)    Estimated body mass index is 37.16 kg/m  as calculated from the following:    Height as of 11/30/19: 1.829 m (6').    Weight as of 3/13/20: 124.3 kg (274 lb).                   DARIELA Plata CRNA

## 2020-06-29 ENCOUNTER — ANESTHESIA (OUTPATIENT)
Dept: GASTROENTEROLOGY | Facility: CLINIC | Age: 65
End: 2020-06-29

## 2020-09-24 ENCOUNTER — TELEPHONE (OUTPATIENT)
Dept: FAMILY MEDICINE | Facility: CLINIC | Age: 65
End: 2020-09-24

## 2020-09-24 DIAGNOSIS — Z13.6 CARDIOVASCULAR SCREENING; LDL GOAL LESS THAN 100: ICD-10-CM

## 2020-09-24 DIAGNOSIS — Z12.5 SCREENING FOR PROSTATE CANCER: ICD-10-CM

## 2020-09-24 DIAGNOSIS — M10.9 ACUTE GOUT OF LEFT WRIST, UNSPECIFIED CAUSE: ICD-10-CM

## 2020-09-24 DIAGNOSIS — M1A.9XX0 CHRONIC GOUT WITHOUT TOPHUS, UNSPECIFIED CAUSE, UNSPECIFIED SITE: Primary | ICD-10-CM

## 2020-09-24 NOTE — TELEPHONE ENCOUNTER
"Requested Prescriptions   Pending Prescriptions Disp Refills     allopurinol (ZYLOPRIM) 100 MG tablet 30 tablet 11     Sig: Take 1 tablet (100 mg) by mouth daily       Gout Agents Protocol Failed - 9/24/2020  1:25 PM        Failed - CBC on file in past 12 months     Recent Labs   Lab Test 07/17/18 2009   WBC 9.7   RBC 4.35*   HGB 14.0   HCT 42.2                    Failed - ALT on file in past 12 months     Recent Labs   Lab Test 08/19/19  0700   ALT 23             Failed - Has Uric Acid on file in past 12 months and value is less than 6     Recent Labs   Lab Test 07/17/18 2009   URIC 7.7*     If level is 6mg/dL or greater, ok to refill one time and refer to provider.           Failed - Normal serum creatinine on file in the past 12 months     Recent Labs   Lab Test 08/19/19  0700   CR 0.71       Ok to refill medication if creatinine is low          Passed - Recent (12 mo) or future (30 days) visit within the authorizing provider's specialty     Patient has had an office visit with the authorizing provider or a provider within the authorizing providers department within the previous 12 mos or has a future within next 30 days. See \"Patient Info\" tab in inbasket, or \"Choose Columns\" in Meds & Orders section of the refill encounter.              Passed - Medication is active on med list        Passed - Patient is age 18 or older             "

## 2020-09-24 NOTE — TELEPHONE ENCOUNTER
Thank you,  Kayla Bingham - Pharmacy Technician  [facility]  [phone]

## 2020-09-28 NOTE — TELEPHONE ENCOUNTER
Routing refill request to provider for review/approval because:  Patient needs to be seen because it has been more than 1 year since last office visit.  Last seen at [facility] Dr. Ciro GARNETT, RN, BSN

## 2020-09-29 RX ORDER — ALLOPURINOL 100 MG/1
100 TABLET ORAL DAILY
Qty: 30 TABLET | Refills: 11 | OUTPATIENT
Start: 2020-09-29

## 2020-09-29 NOTE — TELEPHONE ENCOUNTER
Please call the patient.  He needs to be seen.  I last saw him in 2011.  If an appointment is made please give him a month supply and call to his pharmacy with the allopurinol.  I am guessing I should be able to see him within a month.  José Miguel Maynard MD  Family Medicine

## 2020-10-01 RX ORDER — ALLOPURINOL 100 MG/1
100 TABLET ORAL DAILY
Qty: 30 TABLET | Refills: 0 | Status: SHIPPED | OUTPATIENT
Start: 2020-10-01 | End: 2020-10-13

## 2020-10-01 NOTE — TELEPHONE ENCOUNTER
Patient notified.  In clinic appt scheduled for 10-13-20 (used a virtual slot, please let RN know if this is not ok).  #30 sent to [facility] pharmacy.    Routing to provider.  Griselda BIRD RN

## 2020-10-01 NOTE — TELEPHONE ENCOUNTER
OKSANA Holland should clarify.  He WAS scheduled for an office visit, not virtual.  RN was informing you that a virtual slot was taken for an office visit and wanted to make sure you are ok with that.  He will be coming into clinic.    Griselda BIRD RN BSN

## 2020-10-01 NOTE — TELEPHONE ENCOUNTER
I recommend an office visit since I have not seen this patient in years.  A virtual visit is not appropriate.    He could come in fasting, check cholesterol, PSA level, and side effects of his allopurinol medication.  I can order the future labs.    José Miguel Maynard MD  Family Medicine

## 2020-10-12 NOTE — PROGRESS NOTES
Subjective     Elvin Ware is a 65 year old male who presents to clinic today for the following health issues:    HPI         Hypertension Follow-up      Do you check your blood pressure regularly outside of the clinic? { :822870}     Are you following a low salt diet? { :257825}    Are your blood pressures ever more than 140 on the top number (systolic) OR more   than 90 on the bottom number (diastolic), for example 140/90? { :148346}      How many servings of fruits and vegetables do you eat daily?  { :627088}    On average, how many sweetened beverages do you drink each day (Examples: soda, juice, sweet tea, etc.  Do NOT count diet or artificially sweetened beverages)?   { 1-11:810034}    How many days per week do you exercise enough to make your heart beat faster? { :999537}    How many minutes a day do you exercise enough to make your heart beat faster? { :819047}    How many days per week do you miss taking your medication? {0-7 :606275}    {additonal problems for provider to add (Optional):907026}    Review of Systems   {ROS COMP (Optional):986032}      Objective    There were no vitals taken for this visit.  There is no height or weight on file to calculate BMI.  Physical Exam   {Exam List (Optional):971177}    {Diagnostic Test Results (Optional):323700}        {PROVIDER CHARTING PREFERENCE:875975}

## 2020-10-13 ENCOUNTER — OFFICE VISIT (OUTPATIENT)
Dept: FAMILY MEDICINE | Facility: CLINIC | Age: 65
End: 2020-10-13
Payer: COMMERCIAL

## 2020-10-13 VITALS
WEIGHT: 262 LBS | DIASTOLIC BLOOD PRESSURE: 80 MMHG | RESPIRATION RATE: 18 BRPM | BODY MASS INDEX: 35.49 KG/M2 | SYSTOLIC BLOOD PRESSURE: 168 MMHG | TEMPERATURE: 98.8 F | HEART RATE: 70 BPM | OXYGEN SATURATION: 98 % | HEIGHT: 72 IN

## 2020-10-13 DIAGNOSIS — M1A.9XX0 CHRONIC GOUT WITHOUT TOPHUS, UNSPECIFIED CAUSE, UNSPECIFIED SITE: ICD-10-CM

## 2020-10-13 DIAGNOSIS — Z72.0 TOBACCO ABUSE: ICD-10-CM

## 2020-10-13 DIAGNOSIS — I10 ESSENTIAL HYPERTENSION: Primary | ICD-10-CM

## 2020-10-13 DIAGNOSIS — E66.01 MORBID OBESITY (H): ICD-10-CM

## 2020-10-13 DIAGNOSIS — Z12.5 SCREENING FOR PROSTATE CANCER: ICD-10-CM

## 2020-10-13 DIAGNOSIS — Z23 NEED FOR PROPHYLACTIC VACCINATION AND INOCULATION AGAINST INFLUENZA: ICD-10-CM

## 2020-10-13 LAB
ALT SERPL W P-5'-P-CCNC: 21 U/L (ref 0–70)
ANION GAP SERPL CALCULATED.3IONS-SCNC: 7 MMOL/L (ref 3–14)
BASOPHILS # BLD AUTO: 0 10E9/L (ref 0–0.2)
BASOPHILS NFR BLD AUTO: 0.3 %
BUN SERPL-MCNC: 26 MG/DL (ref 7–30)
CALCIUM SERPL-MCNC: 9 MG/DL (ref 8.5–10.1)
CHLORIDE SERPL-SCNC: 103 MMOL/L (ref 94–109)
CO2 SERPL-SCNC: 28 MMOL/L (ref 20–32)
CREAT SERPL-MCNC: 0.94 MG/DL (ref 0.66–1.25)
DIFFERENTIAL METHOD BLD: NORMAL
EOSINOPHIL # BLD AUTO: 0.2 10E9/L (ref 0–0.7)
EOSINOPHIL NFR BLD AUTO: 2.8 %
ERYTHROCYTE [DISTWIDTH] IN BLOOD BY AUTOMATED COUNT: 13.8 % (ref 10–15)
GFR SERPL CREATININE-BSD FRML MDRD: 85 ML/MIN/{1.73_M2}
GLUCOSE SERPL-MCNC: 105 MG/DL (ref 70–99)
HCT VFR BLD AUTO: 42.5 % (ref 40–53)
HGB BLD-MCNC: 14.2 G/DL (ref 13.3–17.7)
LYMPHOCYTES # BLD AUTO: 2.5 10E9/L (ref 0.8–5.3)
LYMPHOCYTES NFR BLD AUTO: 32.5 %
MCH RBC QN AUTO: 31.6 PG (ref 26.5–33)
MCHC RBC AUTO-ENTMCNC: 33.4 G/DL (ref 31.5–36.5)
MCV RBC AUTO: 95 FL (ref 78–100)
MONOCYTES # BLD AUTO: 0.5 10E9/L (ref 0–1.3)
MONOCYTES NFR BLD AUTO: 6.2 %
NEUTROPHILS # BLD AUTO: 4.4 10E9/L (ref 1.6–8.3)
NEUTROPHILS NFR BLD AUTO: 58.2 %
PLATELET # BLD AUTO: 223 10E9/L (ref 150–450)
POTASSIUM SERPL-SCNC: 4 MMOL/L (ref 3.4–5.3)
PSA SERPL-ACNC: 2.73 UG/L (ref 0–4)
RBC # BLD AUTO: 4.49 10E12/L (ref 4.4–5.9)
SODIUM SERPL-SCNC: 138 MMOL/L (ref 133–144)
WBC # BLD AUTO: 7.6 10E9/L (ref 4–11)

## 2020-10-13 PROCEDURE — 85025 COMPLETE CBC W/AUTO DIFF WBC: CPT | Performed by: FAMILY MEDICINE

## 2020-10-13 PROCEDURE — 90471 IMMUNIZATION ADMIN: CPT | Performed by: FAMILY MEDICINE

## 2020-10-13 PROCEDURE — 99214 OFFICE O/P EST MOD 30 MIN: CPT | Mod: 25 | Performed by: FAMILY MEDICINE

## 2020-10-13 PROCEDURE — G0103 PSA SCREENING: HCPCS | Performed by: FAMILY MEDICINE

## 2020-10-13 PROCEDURE — 90682 RIV4 VACC RECOMBINANT DNA IM: CPT | Performed by: FAMILY MEDICINE

## 2020-10-13 PROCEDURE — 80048 BASIC METABOLIC PNL TOTAL CA: CPT | Performed by: FAMILY MEDICINE

## 2020-10-13 PROCEDURE — 84460 ALANINE AMINO (ALT) (SGPT): CPT | Performed by: FAMILY MEDICINE

## 2020-10-13 RX ORDER — VARENICLINE TARTRATE 1 MG/1
1 TABLET, FILM COATED ORAL 2 TIMES DAILY
Qty: 60 TABLET | Refills: 1 | Status: SHIPPED | OUTPATIENT
Start: 2020-10-13 | End: 2022-06-27

## 2020-10-13 RX ORDER — ALLOPURINOL 100 MG/1
100 TABLET ORAL DAILY
Qty: 90 TABLET | Refills: 3 | Status: SHIPPED | OUTPATIENT
Start: 2020-10-13 | End: 2022-07-11

## 2020-10-13 RX ORDER — COLCHICINE 0.6 MG/1
TABLET ORAL
Qty: 20 TABLET | Refills: 3 | Status: SHIPPED | OUTPATIENT
Start: 2020-10-13 | End: 2022-05-07

## 2020-10-13 RX ORDER — LISINOPRIL 20 MG/1
20 TABLET ORAL DAILY
Qty: 30 TABLET | Refills: 5 | Status: SHIPPED | OUTPATIENT
Start: 2020-10-13 | End: 2020-11-09 | Stop reason: DRUGHIGH

## 2020-10-13 ASSESSMENT — MIFFLIN-ST. JEOR: SCORE: 2016.42

## 2020-10-13 NOTE — LETTER
October 16, 2020      Elvin Ware  [address] [facility] [address]        Dear ,    We are writing to inform you of your test results.    You have normal blood cell count.   You have normal kidney function.   No overt signs of diabetes.   No signs of prostate cancer.   You have normal liver function.   All good labs!     Resulted Orders   Basic metabolic panel   Result Value Ref Range    Sodium 138 133 - 144 mmol/L    Potassium 4.0 3.4 - 5.3 mmol/L    Chloride 103 94 - 109 mmol/L    Carbon Dioxide 28 20 - 32 mmol/L    Anion Gap 7 3 - 14 mmol/L    Glucose 105 (H) 70 - 99 mg/dL    Urea Nitrogen 26 7 - 30 mg/dL    Creatinine 0.94 0.66 - 1.25 mg/dL    GFR Estimate 85 >60 mL/min/[1.73_m2]      Comment:      Non  GFR Calc  Starting 12/18/2018, serum creatinine based estimated GFR (eGFR) will be   calculated using the Chronic Kidney Disease Epidemiology Collaboration   (CKD-EPI) equation.      GFR Estimate If Black >90 >60 mL/min/[1.73_m2]      Comment:       GFR Calc  Starting 12/18/2018, serum creatinine based estimated GFR (eGFR) will be   calculated using the Chronic Kidney Disease Epidemiology Collaboration   (CKD-EPI) equation.      Calcium 9.0 8.5 - 10.1 mg/dL   CBC with platelets differential   Result Value Ref Range    WBC 7.6 4.0 - 11.0 10e9/L    RBC Count 4.49 4.4 - 5.9 10e12/L    Hemoglobin 14.2 13.3 - 17.7 g/dL    Hematocrit 42.5 40.0 - 53.0 %    MCV 95 78 - 100 fl    MCH 31.6 26.5 - 33.0 pg    MCHC 33.4 31.5 - 36.5 g/dL    RDW 13.8 10.0 - 15.0 %    Platelet Count 223 150 - 450 10e9/L    % Neutrophils 58.2 %    % Lymphocytes 32.5 %    % Monocytes 6.2 %    % Eosinophils 2.8 %    % Basophils 0.3 %    Absolute Neutrophil 4.4 1.6 - 8.3 10e9/L    Absolute Lymphocytes 2.5 0.8 - 5.3 10e9/L    Absolute Monocytes 0.5 0.0 - 1.3 10e9/L    Absolute Eosinophils 0.2 0.0 - 0.7 10e9/L    Absolute Basophils 0.0 0.0 - 0.2 10e9/L    Diff Method Automated Method    ALT    Result Value Ref Range    ALT 21 0 - 70 U/L   Prostate spec antigen screen   Result Value Ref Range    PSA 2.73 0 - 4 ug/L      Comment:      Assay Method:  Chemiluminescence using Siemens Vista analyzer       If you have any questions or concerns, please call the clinic at the number listed above.       Sincerely,        José Miguel Maynard MD

## 2020-10-13 NOTE — PROGRESS NOTES
Subjective     Elvin Ware is a 65 year old male who presents to clinic today for the following health issues:    HPI         Medication Followup of ALLOPURINOL 100 mg TABLET    Taking Medication as prescribed: yes    Side Effects:  None    Medication Helping Symptoms:  yes     The medication is working well for his gout.  No side effects.  If he misses the allopurinol then he will get a flare up.      He has HTN too.  He has known that his blood pressure has gone up.  Has not been on medication for HTN.        Review of Systems   Review Of Systems  Skin: negative  Eyes:   Ears/Nose/Throat: negative  Respiratory: No shortness of breath, dyspnea on exertion, cough, or hemoptysis  Cardiovascular: negative  Gastrointestinal:   Genitourinary:   Musculoskeletal: has knee issues  Neurologic:   Psychiatric: wants to quit smoking  Hematologic/Lymphatic/Immunologic:   Endocrine:         Objective    BP (!) 168/80   Pulse 70   Temp 98.8  F (37.1  C) (Tympanic)   Resp 18   Ht 1.829 m (6')   Wt 118.8 kg (262 lb)   SpO2 98%   BMI 35.53 kg/m    Body mass index is 35.53 kg/m .  Physical Exam   GENERAL APPEARANCE: alert, no distress and cooperative  RESP: lungs clear to auscultation - no rales, rhonchi or wheezes  CV: regular rates and rhythm, normal S1 S2, no S3 or S4 and no murmur, click or rub  ABDOMEN: soft, nontender, without hepatosplenomegaly or masses and bowel sounds normal  MS: extremities normal- no gross deformities noted  SKIN: no suspicious lesions or rashes  NEURO: Normal strength and tone, mentation intact and speech normal  PSYCH: mentation appears normal and affect normal/bright            Assessment & Plan     Essential hypertension  New diagnosis, handout on htn, diet, medication, goals reviewed and will do a recheck with a RN blood pressure check.  Will recheck in 3 weeks  - lisinopril (ZESTRIL) 20 MG tablet; Take 1 tablet (20 mg) by mouth daily    Morbid obesity (H)  Work on diet    Chronic gout  without tophus, unspecified cause, unspecified site  Refilled med and check for side effects  - Basic metabolic panel  - CBC with platelets differential  - ALT    Tobacco abuse  He is wanting to quit and sent medication, he has had this before and it worked  - varenicline (CHANTIX MARY JO) 0.5 MG X 11 & 1 MG X 42 tablet; Take 0.5 mg tab daily for 3 days, THEN 0.5 mg tab twice daily for 4 days, THEN 1 mg twice daily. Quit smoking on day 7  - varenicline (CHANTIX) 1 MG tablet; Take 1 tablet (1 mg) by mouth 2 times daily Hold on file until needed.    Screening for prostate cancer    - Prostate spec antigen screen    Need for prophylactic vaccination and inoculation against influenza    - INFLUENZA QUAD, RECOMBINANT, P-FREE (RIV4) (FLUBLOCK) [30346]  - Vaccine Administration, Initial [19605]     Tobacco Cessation:   reports that he has been smoking cigarettes. He has a 30.00 pack-year smoking history. He has never used smokeless tobacco.  Tobacco Cessation Action Plan: Pharmacotherapies : Chantix      BMI:   Estimated body mass index is 35.53 kg/m  as calculated from the following:    Height as of this encounter: 1.829 m (6').    Weight as of this encounter: 118.8 kg (262 lb).   Weight management plan: diet         See Patient Instructions    Return in about 3 weeks (around 11/3/2020) for Nurse visit for a blood pressure check.    José Miguel Maynard MD  [facility]

## 2020-10-13 NOTE — PATIENT INSTRUCTIONS
Please go to lab.    Please quit smoking and I sent through the Chantix medication.    For your blood pressure I am putting you on a medication called Lisinopril which is an ace inhibitor that works with the kidneys.    Please make a nurse blood pressure check in 3 weeks or so.          Thank you for choosing [facility] (formerly [facility]).  You may be receiving an email and/or telephone survey request from [facility] [employer] Experience regarding your visit today.  Please take a few minutes to respond to the survey to let us know how we are doing.      If you have questions or concerns, please contact us via Triductor or you can contact your care team at [phone].    Our Clinic hours are:  Monday 6:40 am  to 7:00 pm  Tuesday -Friday 6:40 am to 5:00 pm    The [place] outpatient lab hours are:  Monday - Friday 6:10 am to 4:45 pm  Saturdays 7:00 am to 11:00 am  Appointments are required, call [phone]    If you have clinical questions after hours or would like to schedule an appointment,  call the clinic at [phone].  Taking ACE Inhibitors  The name of my ACE inhibitor is:  ______________________________     Take your medication at the same time each day.     Why might I need an ACE inhibitor?    It gives you more energy to do the things you enjoy.    It helps you stay out of the hospital.    It helps you live longer.    It strengthens your heart and kidneys.    It can control your blood pressure     Tips to help you    Follow the fact sheet that comes with your medicine. It tells you when and how to take it. Ask for a sheet if you don t get one.    Have a routine for taking your medicine. Take it at the same time each day. A watch with an alarm or a smartphone appcan help    Take your medicine at least 1 hour before you eat, if you are taking captopril or moexipril, as described in the 's instructions, or 2 hours after you eat. You may take all other ACE inhibitors at any time, according to your healthcare  provider's instructions.     Do not change the dose or stop taking your medicine, unless your provider tells you to. It may take a few weeks for you to feel that the medicine is working.     When should I call my healthcare provider?    You have diarrhea, nausea, vomiting, or you are sweating. These can cause loss of water (dehydration) and low blood pressure.    You have a dry, hacking cough or a sore throat.    You feel dizzy or faint, or have a headache.    You have a fever or chills, trouble breathing or swallowing, or swelling in your face, mouth, lips, arms, lower legs, ankles, or feet. These may be signs of an allergic reaction.    You have any other unusual symptoms.    Tell your healthcare provider if you wish to become pregnant or think you may be pregnant. ACE inhibitors can cause serious side effects to your unborn child. Your healthcare provider can prescribe medicine to replace your ACE inhibitor that may be safer to take while you are pregnant or trying to become pregnant.     Date Last Reviewed: 6/1/2016 2000-2019 [employer]. [address] [place] [address]. All rights reserved. This information is not intended as a substitute for professional medical care. Always follow your healthcare professional's instructions.          What is High Blood Pressure?  High blood pressure (also called hypertension) is known as the  silent killer.  This is because most of the time it doesn t cause symptoms. In fact, many people don t know they have it until other problems develop. In most cases, high blood pressure often requires lifelong treatment.  Understanding blood pressure  The circulatory system is made up of the heart and blood vessels that carry blood through the body. Your heart is the pump for this system. With each heartbeat (contraction), the heart sends blood out through large blood vessels called arteries. Blood pressure is a measure of how hard the moving blood pushes  against the walls of the arteries.  High blood pressure can harm your health  In a healthy blood vessel, the blood moves smoothly through the vessel and puts normal pressure on the vessel walls.       High blood pressure occurs when blood pushes too hard against artery walls. This causes damage to the artery walls and then the formation of scar tissue as it heals. This makes the arteries stiff and weak. Plaque sticks to the scarred tissue narrowing and hardening the arteries. High blood pressure also causes your heart to work harder to get blood out to the body. High blood pressure raises your risk of heart attack, also known as acute myocardial infarction, or AMI, heart failure, and stroke. It can also lead to kidney disease, and blindness. In general, if you have high blood pressure, keeping your blood pressure below 130/80 mmHg may help prevent these problems. Your healthcare provider may prescribe medicine to help control blood pressure if lifestyle changes are not enough.  It's important to know your blood pressure numbers. Blood pressure measurements are given as 2 numbers. Systolic blood pressure is the upper number. This is the pressure when the heart contracts. Diastolic blood pressure is the lower number. This is the pressure when the heart relaxes between beats.  Blood pressure is categorized as normal, elevated, or stage 1 or stage 2 high blood pressure:    Normal blood pressure is systolic of less than 120 and diastolic of less than 80 (120/80)    Elevated blood pressure is systolic of 120 to 129 and diastolic less than 80    Stage 1 high blood pressure is systolic is 130 to 139 or diastolic between 80 to 89    Stage 2 high blood pressure is when systolic is 140 or higher or the diastolic is 90 or higher  High blood pressure is diagnosed when multiple, separate readings show blood pressure above 130/80 mmHg. Talk with your healthcare provider if you have questions or concerns about your blood pressure  "readings.  Measuring blood pressure  An example of a blood pressure measurement is 120/70 (120 over 70). The top number is the pressure of blood against the artery walls during a heartbeat (systolic). The bottom number is the pressure of blood against artery walls between heartbeats (diastolic). Talk with your healthcare provider to find out what your blood pressure goals should be.   Controlling blood pressure  If your blood pressure is too high, work with your doctor on a plan for lowering it. Below are steps you can take that will help lower your blood pressure.    Choose heart-healthy foods. Eating healthier meals helps you control your blood pressure. Ask your doctor about the DASH eating plan. This plan helps reduce blood pressure by limiting the amount of sodium (salt) you have in your diet. DASH also encourages eating plenty of fruits and vegetables, low-fat or non-fat dairy, whole-grains, and foods high in fiber, and low in fat. This also provides an enhanced amount of potassium which can also help lower blood pressure.    Reduce sodium. Reducing sodium in your diet reduces fluid retention. Fluid retention caused by too much salt increases blood volume and blood pressure. The American Heart Association s (AHA) \"ideal\" sodium intake recommendation is 1,500 milligrams per day.  However, since American's eat so much salt, the AHA says a positive change can occur by cutting back to even 2,400 milligrams of sodium a day.    Maintain a healthy weight. Being overweight makes you more likely to have high blood pressure. Losing excess weight helps lower blood pressure.    Exercise regularly. Daily exercise helps your heart and blood vessels work better and stay healthier. It can help lower your blood pressure.    Stop smoking. Smoking increases blood pressure and damages blood vessels.    Limit alcohol. Drinking too much alcohol can raise blood pressure. Men should have no more than 2 drinks a day. Women should have " no more than 1. (A drink is equal to 1 beer, or a small glass of wine, or a shot of liquor.)    Control stress. Stress makes your heart work harder and beat faster. Controlling stress helps you control your blood pressure.  Facts about high blood pressure    Feeling OK does not mean that blood pressure is under control. Likewise, feeling bad doesn t mean it s out of control. The only way to know for sure is to check your pressure regularly.    Medicine is only one part of controlling high blood pressure. You also need to manage your weight, get regular exercise, and adjust your eating habits.    High blood pressure is usually a lifelong problem. But it can be controlled with healthy lifestyle changes and medicine.    Hypertension is not the same as stress. Although stress may be a factor in high blood pressure, it s only one part of the story.    Blood pressure medicines need to be taken every day. Stopping suddenly may cause a dangerous increase in pressure.  Date Last Reviewed: 4/27/2016 2000-2019 [employer]. [address] [place] [address]. All rights reserved. This information is not intended as a substitute for professional medical care. Always follow your healthcare professional's instructions.          Eating Heart-Healthy Food: Using the DASH Plan    Eating for your heart doesn t have to be hard or boring. You just need to know how to make healthier choices. The DASH eating plan has been developed to help you do just that. DASH stands for Dietary Approaches to Stop Hypertension. It is a plan that has been proven to be healthier for your heart and to lower your risk for high blood pressure. It can also help lower your risk for cancer, heart disease, osteoporosis, and diabetes.  Choosing from each food group  Choose foods from each of the food groups below each day. Try to get the recommended number of servings for each food group. The serving numbers are based on a diet of 2,000  calories a day. Talk to your doctor if you re unsure about your calorie needs. Along with getting the correct servings, the DASH plan also recommends a sodium intake less than 2,300 mg per day.        Grains  Servings: 6 to 8 a day  A serving is:    1 slice bread    1 ounce dry cereal    Half a cup cooked rice, pasta or cereal  Best choices: Whole grains and any grains high in fiber. Vegetables  Servings: 4 to 5 a day  A serving is:    1 cup raw leafy vegetable    Half a cup cut-up raw or cooked vegetable    Half a cup vegetable juice  Best choices: Fresh or frozen vegetables prepared without added salt or fat.   Fruits  Servings: 4 to 5 a day  A serving is:    1 medium fruit    One-quarter cup dried fruit    Half a cup fresh, frozen, or canned fruit    Half a cup of 100% fruit juices  Best choices: A variety of fresh fruits of different colors. Whole fruits are a better choice than fruit juices. Low-fat or fat-free dairy  Servings: 2 to 3 a day  A serving is:    1 cup milk    1 cup yogurt    One and a half ounces cheese  Best choices: Skim or 1% milk, low-fat or fat-free yogurt or buttermilk, and low-fat cheeses.         Lean meats, poultry, fish  Servings: 6 or fewer a day  A serving is:    1 ounce cooked meats, poultry, or fish    1 egg  Best choices: Lean poultry and fish. Trim away visible fat. Broil, grill, roast, or boil instead of frying. Remove skin from poultry before eating. Limit how much red meat you eat.  Nuts, seeds, beans  Servings: 4 to 5 a week  A serving is:    One-third cup nuts (one and a half ounces)    2 tablespoons nut butter or seeds    Half a cup cooked dry beans or legumes  Best choices: Dry roasted nuts with no salt added, lentils, kidney beans, garbanzo beans, and whole enamorado beans.   Fats and oils  Servings: 2 to 3 a day  A serving is:    1 teaspoon vegetable oil    1 teaspoon soft margarine    1 tablespoon mayonnaise    2 tablespoons salad dressing  Best choices: Nut and vegetable  oils (nontropical vegetable oils), such as olive and canola oil. Sweets  Servings: 5 a week or fewer  A serving is:    1 tablespoon sugar, maple syrup, or honey    1 tablespoon jam or jelly    1 half-ounce jelly beans (about 15)    1 cup lemonade  Best choices: Dried fruit can be a satisfying sweet. Choose low-fat sweets. And watch your serving sizes!      For more on the DASH eating plan, visit:  www.nhlbi.nih.gov/health/health-topics/topics/dash   Date Last Reviewed: 6/1/2016 2000-2019 [employer]. [address] [place] [address]. All rights reserved. This information is not intended as a substitute for professional medical care. Always follow your healthcare professional's instructions.          Lisinopril Oral tablet  What is this medicine?  LISINOPRIL (lyse IN oh pril) is an ACE inhibitor. This medicine is used to treat high blood pressure and heart failure. It is also used to protect the heart immediately after a heart attack.  This medicine may be used for other purposes; ask your health care provider or pharmacist if you have questions.  What should I tell my health care provider before I take this medicine?  They need to know if you have any of these conditions:    diabetes    heart or blood vessel disease    immune system disease like lupus or scleroderma    kidney disease    low blood pressure    previous swelling of the tongue, face, or lips with difficulty breathing, difficulty swallowing, hoarseness, or tightening of the throat    an unusual or allergic reaction to lisinopril, other ACE inhibitors, insect venom, foods, dyes, or preservatives    pregnant or trying to get pregnant    breast-feeding  How should I use this medicine?  Take this medicine by mouth with a glass of water. Follow the directions on your prescription label. You may take this medicine with or without food. Take your medicine at regular intervals. Do not stop taking this medicine except on the advice of  your doctor or health care professional.  Talk to your pediatrician regarding the use of this medicine in children. Special care may be needed. While this drug may be prescribed for children as young as 6 years of age for selected conditions, precautions do apply.  Overdosage: If you think you have taken too much of this medicine contact a poison control center or emergency room at once.  NOTE: This medicine is only for you. Do not share this medicine with others.  What if I miss a dose?  If you miss a dose, take it as soon as you can. If it is almost time for your next dose, take only that dose. Do not take double or extra doses.  What may interact with this medicine?    diuretics    lithium    NSAIDs, medicines for pain and inflammation, like ibuprofen or naproxen    over-the-counter herbal supplements like hawthorn    potassium salts or potassium supplements    salt substitutes  This list may not describe all possible interactions. Give your health care provider a list of all the medicines, herbs, non-prescription drugs, or dietary supplements you use. Also tell them if you smoke, drink alcohol, or use illegal drugs. Some items may interact with your medicine.  What should I watch for while using this medicine?  Visit your doctor or health care professional for regular check ups. Check your blood pressure as directed. Ask your doctor what your blood pressure should be, and when you should contact him or her. Call your doctor or health care professional if you notice an irregular or fast heart beat.  Women should inform their doctor if they wish to become pregnant or think they might be pregnant. There is a potential for serious side effects to an unborn child. Talk to your health care professional or pharmacist for more information.  Check with your doctor or health care professional if you get an attack of severe diarrhea, nausea and vomiting, or if you sweat a lot. The loss of too much body fluid can make it  dangerous for you to take this medicine.  You may get drowsy or dizzy. Do not drive, use machinery, or do anything that needs mental alertness until you know how this drug affects you. Do not stand or sit up quickly, especially if you are an older patient. This reduces the risk of dizzy or fainting spells. Alcohol can make you more drowsy and dizzy. Avoid alcoholic drinks.  Avoid salt substitutes unless you are told otherwise by your doctor or health care professional.  Do not treat yourself for coughs, colds, or pain while you are taking this medicine without asking your doctor or health care professional for advice. Some ingredients may increase your blood pressure.  What side effects may I notice from receiving this medicine?  Side effects that you should report to your doctor or health care professional as soon as possible:    abdominal pain with or without nausea or vomiting    allergic reactions like skin rash or hives, swelling of the hands, feet, face, lips, throat, or tongue    dark urine    difficulty breathing    dizzy, lightheaded or fainting spell    fever or sore throat    irregular heart beat, chest pain    pain or difficulty passing urine    redness, blistering, peeling or loosening of the skin, including inside the mouth    unusually weak    yellowing of the eyes or skin  Side effects that usually do not require medical attention (report to your doctor or health care professional if they continue or are bothersome):    change in taste    cough    decreased sexual function or desire    headache    sun sensitivity    tiredness  This list may not describe all possible side effects. Call your doctor for medical advice about side effects. You may report side effects to FDA at 8-348-LUE-8785.  Where should I keep my medicine?  Keep out of the reach of children.  Store at room temperature between 15 and 30 degrees C (59 and 86 degrees F). Protect from moisture. Keep container tightly closed. Throw away any  unused medicine after the expiration date.  NOTE:This sheet is a summary. It may not cover all possible information. If you have questions about this medicine, talk to your doctor, pharmacist, or health care provider. Copyright  2016 [employer]

## 2020-11-09 ENCOUNTER — ALLIED HEALTH/NURSE VISIT (OUTPATIENT)
Dept: FAMILY MEDICINE | Facility: CLINIC | Age: 65
End: 2020-11-09
Payer: COMMERCIAL

## 2020-11-09 ENCOUNTER — TELEPHONE (OUTPATIENT)
Dept: FAMILY MEDICINE | Facility: CLINIC | Age: 65
End: 2020-11-09

## 2020-11-09 VITALS
DIASTOLIC BLOOD PRESSURE: 80 MMHG | RESPIRATION RATE: 20 BRPM | OXYGEN SATURATION: 95 % | SYSTOLIC BLOOD PRESSURE: 142 MMHG | HEART RATE: 69 BPM

## 2020-11-09 DIAGNOSIS — I10 HYPERTENSION, UNSPECIFIED TYPE: Primary | ICD-10-CM

## 2020-11-09 PROCEDURE — 99207 PR NO CHARGE NURSE ONLY: CPT

## 2020-11-09 RX ORDER — LISINOPRIL 40 MG/1
40 TABLET ORAL DAILY
Qty: 30 TABLET | Refills: 5 | Status: SHIPPED | OUTPATIENT
Start: 2020-11-09 | End: 2022-06-29

## 2020-11-09 NOTE — TELEPHONE ENCOUNTER
Dr. Maynard,    Patient comes into the clinic today for a BP CK.  Medications and allergies are gone over with the patient and are up to date.  Dipika HAYWOOD RN    Vital Signs 11/9/2020 11/9/2020   Systolic 182 142   Diastolic 84 80   Pulse 64 69   Temperature     Respirations 20    Weight (LB)     Height     BMI (Calculated)     Pain     O2 94 95

## 2020-11-09 NOTE — NURSING NOTE
Patient comes into the clinic today for a BP CK.  Medications and allergies are gone over with the patient and are up to date.  Dipika HAYWOOD RN

## 2020-11-09 NOTE — TELEPHONE ENCOUNTER
His blood pressure is not ideally controlled.  Increase his lisinopril to 40 mg a day, recheck his blood pressure with a nurse visit and lab visit in 3 weeks.  I will send a new prescription to his pharmacy and order his future lab.  Sincerely,  José Miguel Maynard MD

## 2020-11-24 DIAGNOSIS — Z72.0 TOBACCO ABUSE: ICD-10-CM

## 2020-11-24 NOTE — TELEPHONE ENCOUNTER
"Requested Prescriptions   Pending Prescriptions Disp Refills     varenicline (CHANTIX MARY JO) 0.5 MG X 11 & 1 MG X 42 tablet 53 tablet 0     Sig: Take 0.5 mg tab daily for 3 days, THEN 0.5 mg tab twice daily for 4 days, THEN 1 mg twice daily. Quit smoking on day 7       Partial Cholinergic Nicotinic Agonist Agents Failed - 11/24/2020  3:09 PM        Failed - Blood pressure under 140/90 in past 12 months     BP Readings from Last 3 Encounters:   11/09/20 (!) 142/80   10/13/20 (!) 168/80   03/13/20 (!) 142/78                 Passed - Recent (12 mo) or future (30 days) visit within the authorizing provider's specialty     Patient has had an office visit with the authorizing provider or a provider within the authorizing providers department within the previous 12 mos or has a future within next 30 days. See \"Patient Info\" tab in inbasket, or \"Choose Columns\" in Meds & Orders section of the refill encounter.              Passed - Medication is active on med list        Passed - Patient is 18 years of age or older             "

## 2020-11-30 NOTE — TELEPHONE ENCOUNTER
Please call this patient,  He was started on the starting pack for chantix on 10/20/2020, he has refills for the ongoing maintenance dose already at his pharmacy, he just needs to call the pharmacy.  José Miguel Maynard MD  Family Medicine

## 2022-05-07 ENCOUNTER — OFFICE VISIT (OUTPATIENT)
Dept: URGENT CARE | Facility: URGENT CARE | Age: 67
End: 2022-05-07
Payer: COMMERCIAL

## 2022-05-07 VITALS
OXYGEN SATURATION: 96 % | HEART RATE: 74 BPM | TEMPERATURE: 98.4 F | WEIGHT: 247 LBS | BODY MASS INDEX: 33.5 KG/M2 | SYSTOLIC BLOOD PRESSURE: 150 MMHG | DIASTOLIC BLOOD PRESSURE: 76 MMHG

## 2022-05-07 DIAGNOSIS — M10.9 ACUTE GOUT OF LEFT HAND, UNSPECIFIED CAUSE: Primary | ICD-10-CM

## 2022-05-07 PROCEDURE — 99213 OFFICE O/P EST LOW 20 MIN: CPT | Performed by: FAMILY MEDICINE

## 2022-05-07 RX ORDER — COLCHICINE 0.6 MG/1
TABLET ORAL
Qty: 20 TABLET | Refills: 1 | Status: SHIPPED | OUTPATIENT
Start: 2022-05-07

## 2022-05-07 RX ORDER — PREDNISONE 20 MG/1
TABLET ORAL
Qty: 20 TABLET | Refills: 0 | Status: SHIPPED | OUTPATIENT
Start: 2022-05-07 | End: 2022-06-27

## 2022-05-07 NOTE — PROGRESS NOTES
Assessment & Plan     Acute gout of left hand, unspecified cause  Differentials discussed in detail.  Symptoms likely secondary to acute gout involving left hand.  Colchicine and prednisone prescribed, has worked well in the past.  Dietary counseling provided and written information provided.  Recommended to follow-up with PCP in 5 to 7 days or earlier if needed.  Patient understood and in agreement with above plan.  All questions answered.  - colchicine (COLCYRS) 0.6 MG tablet; Take 2 tabs at onset of gout flare, then take 1 tab one hour later, if not resolved the next day take one tab bid.  - predniSONE (DELTASONE) 20 MG tablet; Take 3 tabs by mouth daily x 3 days, then 2 tabs daily x 3 days, then 1 tab daily x 3 days, then 1/2 tab daily x 3 days.      Tobin Fernandez MD  [facility] [place]    Asuncion Oconnor is a 67 year old who presents for the following health issues     HPI     Gout/ Single Inflamed Joint  Chief Complaint   Patient presents with     Swelling     Left hand swelling hx of gout     Onset/Duration: this morning  Description:   Location: left hand  Joint Swelling: YES  Redness: no  Pain: YES  Intensity: moderate  Progression of Symptoms: same  Accompanying Signs & Symptoms:  Fevers: no  History:   Trauma to the area: no  Previous history of gout: YES  Recent illness: no  Alcohol use: no  Diuretic use: no  Precipitating or alleviating factors: None  Therapies tried and outcome: none      Review of Systems   Constitutional, HEENT, cardiovascular, pulmonary, gi and gu systems are negative, except as otherwise noted.      Objective    BP (!) 150/76   Pulse 74   Temp 98.4  F (36.9  C)   Wt 112 kg (247 lb)   SpO2 96%   BMI 33.50 kg/m    Body mass index is 33.5 kg/m .  Physical Exam   GENERAL: alert and no distress  NECK: no adenopathy, no asymmetry, masses, or scars and thyroid normal to palpation  RESP: lungs clear to auscultation - no rales, rhonchi or wheezes  CV:  regular rates and rhythm, normal S1 S2, no S3 or S4 and no murmur, click or rub  MS: Left hand significantly swollen, tender and warm to palpation, range of movement limited, no skin discoloration noted, sensation to touch and pressure intact, normal capillary refills  SKIN: no suspicious lesions or rashes  NEURO: Normal strength and tone, mentation intact and speech normal

## 2022-06-27 ENCOUNTER — OFFICE VISIT (OUTPATIENT)
Dept: FAMILY MEDICINE | Facility: CLINIC | Age: 67
End: 2022-06-27
Payer: COMMERCIAL

## 2022-06-27 VITALS
WEIGHT: 252 LBS | HEIGHT: 72 IN | TEMPERATURE: 98 F | HEART RATE: 70 BPM | BODY MASS INDEX: 34.13 KG/M2 | DIASTOLIC BLOOD PRESSURE: 80 MMHG | RESPIRATION RATE: 18 BRPM | SYSTOLIC BLOOD PRESSURE: 180 MMHG

## 2022-06-27 DIAGNOSIS — I99.8 POORLY CONTROLLED BLOOD PRESSURE: Primary | ICD-10-CM

## 2022-06-27 DIAGNOSIS — Z72.0 TOBACCO ABUSE: ICD-10-CM

## 2022-06-27 DIAGNOSIS — M72.0 DUPUYTREN CONTRACTURE: ICD-10-CM

## 2022-06-27 PROBLEM — E66.01 MORBID OBESITY (H): Status: RESOLVED | Noted: 2020-10-13 | Resolved: 2022-06-27

## 2022-06-27 PROCEDURE — 99214 OFFICE O/P EST MOD 30 MIN: CPT | Performed by: FAMILY MEDICINE

## 2022-06-27 RX ORDER — NICOTINE 21 MG/24HR
1 PATCH, TRANSDERMAL 24 HOURS TRANSDERMAL EVERY 24 HOURS
Qty: 30 PATCH | Refills: 1 | Status: SHIPPED | OUTPATIENT
Start: 2022-06-27

## 2022-06-27 RX ORDER — AMLODIPINE BESYLATE 10 MG/1
10 TABLET ORAL DAILY
Qty: 90 TABLET | Refills: 1 | Status: SHIPPED | OUTPATIENT
Start: 2022-06-27

## 2022-06-27 ASSESSMENT — PAIN SCALES - GENERAL: PAINLEVEL: NO PAIN (0)

## 2022-06-27 ASSESSMENT — ENCOUNTER SYMPTOMS: WEAKNESS: 1

## 2022-06-27 NOTE — NURSING NOTE
Chief Complaint   Patient presents with     Hypertension     Arthritis     BP (!) 180/80 (Cuff Size: Adult Large)   Pulse 70   Temp 98  F (36.7  C) (Tympanic)   Resp 18   Ht 1.829 m (6')   Wt 114.3 kg (252 lb)   BMI 34.18 kg/m   Estimated body mass index is 34.18 kg/m  as calculated from the following:    Height as of this encounter: 1.829 m (6').    Weight as of this encounter: 114.3 kg (252 lb).  Patient presents to the clinic using No DME      Health Maintenance that is potentially due pending provider review:    Health Maintenance Due   Topic Date Due     ANNUAL REVIEW OF HM ORDERS  Never done     Pneumococcal Vaccine: 65+ Years (1 - PCV) Never done     ZOSTER IMMUNIZATION (1 of 2) Never done     COLORECTAL CANCER SCREENING  01/22/2017     ADVANCE CARE PLANNING  12/03/2017     LUNG CANCER SCREENING  08/19/2020     MEDICARE ANNUAL WELLNESS VISIT  Never done     FALL RISK ASSESSMENT  Never done     AORTIC ANEURYSM SCREENING (SYSTEM ASSIGNED)  Never done     COVID-19 Vaccine (4 - Booster for Pfizer series) 04/16/2022

## 2022-06-27 NOTE — PROGRESS NOTES
Assessment & Plan     Poorly controlled blood pressure  Blood pressure above target goal of less than 140/90.  Treatment option discussed.  Amlodipine prescribed, common side effects discussed.  Recommended well hydration, DASH diet, regular exercise, weight loss, limit alcohol and to avoid caffeine/soda.  Follow-up in 2 weeks or earlier if needed  - amLODIPine (NORVASC) 10 MG tablet; Take 1 tablet (10 mg) by mouth daily    Tobacco abuse  Smoking cessation counseling provided, associated health hazards explained in detail, stop taking Chantix due to nightmares.  Nicotine patch prescribed  - nicotine (NICODERM CQ) 14 MG/24HR 24 hr patch; Place 1 patch onto the skin every 24 hours    Dupuytren contracture  Hand surgery referral placed  - Orthopedic  Referral; Future      Tobacco Cessation:   reports that he has been smoking cigarettes. He has a 30.00 pack-year smoking history. He has never used smokeless tobacco.  Tobacco Cessation Action Plan: Pharmacotherapies : Nicotine patch    BMI:   Estimated body mass index is 34.18 kg/m  as calculated from the following:    Height as of this encounter: 1.829 m (6').    Weight as of this encounter: 114.3 kg (252 lb).   Weight management plan: Discussed healthy diet and exercise guidelines      Tobin Fernandez MD  [facility]    Asuncion Oconnor is a 67 year old, presenting for the following health issues:  Hypertension and Arthritis      History of Present Illness       Hypertension: He presents for follow up of hypertension.  He does not check blood pressure  regularly outside of the clinic. Outside blood pressures have been over 140/90. (was too high at the dentist. needs to get it lower in order to get his dentures. ) He follows a low salt diet.    Arthritis  This is a recurrent problem. The current episode started more than 1 month ago. The problem occurs constantly. The problem has been unchanged. Associated symptoms include  weakness. Exacerbated by: can't bend fingers, wrist is swollen, can't  the clutch on his motorcycle  Treatments tried: alluproniol, prednisone.       Review of Systems   Musculoskeletal: Positive for arthritis.   Neurological: Positive for weakness.        Constitutional, HEENT, cardiovascular, pulmonary, GI, , musculoskeletal, neuro, skin, endocrine and psych systems are negative, except as otherwise noted.      Objective    BP (!) 180/80 (Cuff Size: Adult Large)   Pulse 70   Temp 98  F (36.7  C) (Tympanic)   Resp 18   Ht 1.829 m (6')   Wt 114.3 kg (252 lb)   BMI 34.18 kg/m    Body mass index is 34.18 kg/m .  Physical Exam   GENERAL: alert, no distress and obese  EYES: Eyes grossly normal to inspection, PERRL and conjunctivae and sclerae normal  NECK: no adenopathy, no asymmetry, masses, or scars and thyroid normal to palpation  RESP: lungs clear to auscultation - no rales, rhonchi or wheezes  CV: regular rate and rhythm, normal S1 S2, no S3 or S4, no murmur, click or rub, no peripheral edema and peripheral pulses strong  ABDOMEN: soft, nontender, no hepatosplenomegaly, no masses and bowel sounds normal  MS: Dupuytren contractures involving hands bilaterally, no joint swelling or skin discoloration noted  SKIN: no suspicious lesions or rashes  NEURO: Normal strength and tone, mentation intact and speech normal  PSYCH: mentation appears normal, affect normal/bright                .  ..

## 2022-07-11 ENCOUNTER — OFFICE VISIT (OUTPATIENT)
Dept: FAMILY MEDICINE | Facility: CLINIC | Age: 67
End: 2022-07-11
Payer: COMMERCIAL

## 2022-07-11 VITALS
WEIGHT: 244 LBS | TEMPERATURE: 97.1 F | DIASTOLIC BLOOD PRESSURE: 65 MMHG | SYSTOLIC BLOOD PRESSURE: 135 MMHG | BODY MASS INDEX: 33.05 KG/M2 | HEIGHT: 72 IN | RESPIRATION RATE: 18 BRPM | HEART RATE: 70 BPM

## 2022-07-11 DIAGNOSIS — Z12.11 COLON CANCER SCREENING: ICD-10-CM

## 2022-07-11 DIAGNOSIS — Z12.2 SCREENING FOR LUNG CANCER: ICD-10-CM

## 2022-07-11 DIAGNOSIS — M10.00 IDIOPATHIC GOUT, UNSPECIFIED CHRONICITY, UNSPECIFIED SITE: ICD-10-CM

## 2022-07-11 DIAGNOSIS — I10 HYPERTENSION, UNSPECIFIED TYPE: Primary | ICD-10-CM

## 2022-07-11 PROCEDURE — 99213 OFFICE O/P EST LOW 20 MIN: CPT | Performed by: FAMILY MEDICINE

## 2022-07-11 RX ORDER — ALLOPURINOL 100 MG/1
100 TABLET ORAL DAILY
Qty: 90 TABLET | Refills: 3 | Status: SHIPPED | OUTPATIENT
Start: 2022-07-11

## 2022-07-11 ASSESSMENT — PAIN SCALES - GENERAL: PAINLEVEL: NO PAIN (0)

## 2022-07-11 NOTE — PROGRESS NOTES
Assessment & Plan     Hypertension, unspecified type  Blood pressure within target goal of less than 140/90.  Recommended to continue amlodipine, lisinopril, regular exercise, healthy diet and weight loss.    Idiopathic gout, unspecified chronicity, unspecified site  Allopurinol refilled  - allopurinol (ZYLOPRIM) 100 MG tablet; Take 1 tablet (100 mg) by mouth daily    Colon cancer screening  Screening colonoscopy ordered  - Colonscopy Screening  Referral; Future    Screening for lung cancer  Smoking cessation counseling provided, low-dose CT chest ordered  - CT Chest Lung Cancer Scrn Low Dose wo; Future      Tobin Fernandez MD  [facility]    Asuncion Oconnor is a 67 year old, presenting for the following health issues:  Hypertension      History of Present Illness       Hypertension: He presents for follow up of hypertension.  He does check blood pressure  (at times- not sure if his machine is working right. ) regularly outside of the clinic. Outside blood pressures have been over 140/90. He follows a low salt diet.         BP Readings from Last 3 Encounters:   07/11/22 135/65   06/27/22 (!) 180/80   05/07/22 (!) 150/76       Review of Systems   Constitutional, HEENT, cardiovascular, pulmonary, GI, , musculoskeletal, neuro, skin, endocrine and psych systems are negative, except as otherwise noted.      Objective    /65   Pulse 70   Temp 97.1  F (36.2  C) (Tympanic)   Resp 18   Ht 1.829 m (6')   Wt 110.7 kg (244 lb)   BMI 33.09 kg/m    Body mass index is 33.09 kg/m .  Physical Exam   GENERAL: alert and no distress  EYES: Eyes grossly normal to inspection, PERRL and conjunctivae and sclerae normal  NECK: no adenopathy, no asymmetry, masses, or scars and thyroid normal to palpation  RESP: lungs clear to auscultation - no rales, rhonchi or wheezes  CV: regular rates and rhythm, normal S1 S2, no S3 or S4 and no murmur, click or rub  MS: no gross musculoskeletal  defects noted, no edema  SKIN: no suspicious lesions or rashes  NEURO: Normal strength and tone, mentation intact and speech normal  PSYCH: mentation appears normal, affect normal/bright                .  ..

## 2022-07-11 NOTE — NURSING NOTE
Chief Complaint   Patient presents with     Hypertension     BP (!) 140/62 (Cuff Size: Adult Large)   Pulse 70   Temp 97.1  F (36.2  C) (Tympanic)   Resp 18   Ht 1.829 m (6')   Wt 110.7 kg (244 lb)   BMI 33.09 kg/m   Estimated body mass index is 33.09 kg/m  as calculated from the following:    Height as of this encounter: 1.829 m (6').    Weight as of this encounter: 110.7 kg (244 lb).  Patient presents to the clinic using No DME      Health Maintenance that is potentially due pending provider review:    Health Maintenance Due   Topic Date Due     ANNUAL REVIEW OF HM ORDERS  Never done     Pneumococcal Vaccine: 65+ Years (1 - PCV) Never done     ZOSTER IMMUNIZATION (1 of 2) Never done     COLORECTAL CANCER SCREENING  01/22/2017     ADVANCE CARE PLANNING  12/03/2017     LUNG CANCER SCREENING  08/19/2020     MEDICARE ANNUAL WELLNESS VISIT  Never done     AORTIC ANEURYSM SCREENING (SYSTEM ASSIGNED)  Never done     COVID-19 Vaccine (4 - Booster for Pfizer series) 04/16/2022                 [Negative] : Heme/Lymph

## 2022-07-22 ENCOUNTER — OFFICE VISIT (OUTPATIENT)
Dept: ORTHOPEDICS | Facility: CLINIC | Age: 67
End: 2022-07-22
Attending: FAMILY MEDICINE

## 2022-07-22 ENCOUNTER — ANCILLARY PROCEDURE (OUTPATIENT)
Dept: GENERAL RADIOLOGY | Facility: CLINIC | Age: 67
End: 2022-07-22
Attending: PEDIATRICS
Payer: COMMERCIAL

## 2022-07-22 VITALS
HEART RATE: 85 BPM | SYSTOLIC BLOOD PRESSURE: 122 MMHG | BODY MASS INDEX: 33.09 KG/M2 | DIASTOLIC BLOOD PRESSURE: 80 MMHG | WEIGHT: 244 LBS

## 2022-07-22 DIAGNOSIS — M20.032 SWAN-NECK DEFORMITY OF FINGER OF LEFT HAND: ICD-10-CM

## 2022-07-22 DIAGNOSIS — M72.0 DUPUYTREN CONTRACTURE: ICD-10-CM

## 2022-07-22 DIAGNOSIS — M19.049 HAND ARTHRITIS: ICD-10-CM

## 2022-07-22 DIAGNOSIS — M79.642 LEFT HAND PAIN: Primary | ICD-10-CM

## 2022-07-22 DIAGNOSIS — M79.642 LEFT HAND PAIN: ICD-10-CM

## 2022-07-22 PROCEDURE — 73130 X-RAY EXAM OF HAND: CPT | Mod: TC | Performed by: RADIOLOGY

## 2022-07-22 PROCEDURE — 99244 OFF/OP CNSLTJ NEW/EST MOD 40: CPT | Performed by: PEDIATRICS

## 2022-07-22 NOTE — PATIENT INSTRUCTIONS
We discussed these other possible diagnosis:  Symptoms are likely multifactorial.  MCP joint deformity and history of various episodes of hand swelling raises some concern for rheumatoid arthritis.  Patient also has Dupuytren contractures, and early likely swan-neck deformity of left middle finger.    Plan:  - Today's Plan of Care:  Inflammatory labs ordered  Rehab: Occupational Therapy: [facility] - [phone]  Referral to Hand Surgery    -We also discussed other future treatment options:  Referral to Rheumatology    Follow Up: will call with lab results    If you have any further questions for your physician or physician s care team you can call [phone] and use option 3 to leave a voice message.

## 2022-07-22 NOTE — LETTER
7/22/2022         RE: Elvin Ware  [address] [facility] [address]        Dear Colleague,    Thank you for referring your patient, Elvin Ware, to the [facility] [place]. Please see a copy of my visit note below.    ASSESSMENT & PLAN    Elvin was seen today for pain and pain.    Diagnoses and all orders for this visit:    Left hand pain  -     XR Hand Left G/E 3 Views; Future  -     CRP inflammation; Future  -     Erythrocyte sedimentation rate auto; Future  -     Rheumatoid factor; Future  -     Cyclic Citrullinated Peptide Antibody IgG; Future  -     Orthopedic  Referral; Future  -     Occupational Therapy Referral; Future    Dupuytren contracture  -     Orthopedic  Referral  -     CRP inflammation; Future  -     Erythrocyte sedimentation rate auto; Future  -     Rheumatoid factor; Future  -     Cyclic Citrullinated Peptide Antibody IgG; Future  -     Orthopedic  Referral; Future  -     Occupational Therapy Referral; Future    Hand arthritis  -     CRP inflammation; Future  -     Erythrocyte sedimentation rate auto; Future  -     Rheumatoid factor; Future  -     Cyclic Citrullinated Peptide Antibody IgG; Future  -     Orthopedic  Referral; Future  -     Occupational Therapy Referral; Future    [place]-neck deformity of finger of left hand  -     CRP inflammation; Future  -     Erythrocyte sedimentation rate auto; Future  -     Rheumatoid factor; Future  -     Cyclic Citrullinated Peptide Antibody IgG; Future  -     Orthopedic  Referral; Future  -     Occupational Therapy Referral; Future      This issue is acute and Unchanged.    We discussed these other possible diagnosis:  Symptoms are likely multifactorial.  MCP joint deformity and history of various episodes of hand swelling raises some concern for rheumatoid arthritis.  Patient also has Dupuytren contractures, and early likely swan-neck deformity of left middle  finger.    Plan:  - Today's Plan of Care:  Inflammatory labs ordered  Rehab[facility] [phone]  Referral to Hand Surgery    -We also discussed other future treatment options:  Referral to Rheumatology    Follow Up: will call with lab results    Concerning signs and symptoms were reviewed.  The patient expressed understanding of this management plan and all questions were answered at this time.    Thanks for the opportunity to participate in the care of this patient, I will keep you updated on their progress.    CC: Tobin Saravia MD [facility]  Sports Medicine Physician  [facility]    -----  Chief Complaint   Patient presents with     Left Middle Finger - Pain     Left Ring Finger - Pain       SUBJECTIVE  Elvin B Chaz is a/an 67 year old male who is seen in consultation at the request of  Tobin Fernandez M.D. for evaluation of left hand, contracture of the hand.      The patient is seen by themselves.  The patient is Right handed    Onset: 3 month(s) ago. Reports insidious onset without acute precipitating event.  He states he had a gout flare up with a big swollen red, was on prednisone and then he couldn't flex fingers.    Location of Pain: left hand; middle and ring finger  Worsened by: flexion of the hand/fingers, playing guitar, riding his motorcycle  Better with: advil  Treatments tried: rest/activity avoidance and ibuprofen  Associated symptoms: swelling, warmth, redness, weakness of left 3rd and 4th and feeling of instability    Orthopedic/Surgical history: YES - Date: has previously had contractures on his right hand, has had many gout flare ups  Social History/Occupation:  of hearing aids, working on computer    No family history pertinent to patient's problem today.    REVIEW OF SYSTEMS:  Review of Systems  Skin: no bruising, no swelling  Musculoskeletal: as above  Neurologic: no numbness, paresthesias  Remainder of  review of systems is negative including constitutional, CV, pulmonary, GI, except as noted in HPI or medical history.    OBJECTIVE:  /80   Pulse 85   Wt 110.7 kg (244 lb)   BMI 33.09 kg/m     General: healthy, alert and in no distress  HEENT: no scleral icterus or conjunctival erythema  Skin: no suspicious lesions or rash. No jaundice.  CV: distal perfusion intact  Resp: normal respiratory effort without conversational dyspnea   Psych: normal mood and affect  Gait: normal steady gait with appropriate coordination and balance  Neuro: Normal light sensory exam of upper extremity    Bilateral Wrist and Hand exam    Inspection:       Swelling: MCP joints bilaterally and palmar contractures bilaterally    Tender:       no    Non Tender:       Remainder of the Wrist and Hand bilateral    ROM:       Decreased ability for flex at PIP joints 3rd - 4th fingers on the left    Strength:       Decreased strength 3rd and 4th fingers left hand    Neurovascular:       2+ radial pulses bilaterally with brisk capillary refill and      normal sensation to light touch in the radial, median and ulnar nerve distributions      RADIOLOGY:  I independently ordered, visualized and reviewed these images with the patient  3 XR views of left hand reviewed: no acute bony abnormality, scattered degenerative changes throughout  - will follow official read    Review of the result(s) of each unique test - XR             Again, thank you for allowing me to participate in the care of your patient.        Sincerely,        Radha Saravia MD

## 2022-07-22 NOTE — PROGRESS NOTES
ASSESSMENT & PLAN    Elvin was seen today for pain and pain.    Diagnoses and all orders for this visit:    Left hand pain  -     XR Hand Left G/E 3 Views; Future  -     CRP inflammation; Future  -     Erythrocyte sedimentation rate auto; Future  -     Rheumatoid factor; Future  -     Cyclic Citrullinated Peptide Antibody IgG; Future  -     Orthopedic  Referral; Future  -     Occupational Therapy Referral; Future    Dupuytren contracture  -     Orthopedic  Referral  -     CRP inflammation; Future  -     Erythrocyte sedimentation rate auto; Future  -     Rheumatoid factor; Future  -     Cyclic Citrullinated Peptide Antibody IgG; Future  -     Orthopedic  Referral; Future  -     Occupational Therapy Referral; Future    Hand arthritis  -     CRP inflammation; Future  -     Erythrocyte sedimentation rate auto; Future  -     Rheumatoid factor; Future  -     Cyclic Citrullinated Peptide Antibody IgG; Future  -     Orthopedic  Referral; Future  -     Occupational Therapy Referral; Future    [place]-neck deformity of finger of left hand  -     CRP inflammation; Future  -     Erythrocyte sedimentation rate auto; Future  -     Rheumatoid factor; Future  -     Cyclic Citrullinated Peptide Antibody IgG; Future  -     Orthopedic  Referral; Future  -     Occupational Therapy Referral; Future      This issue is acute and Unchanged.    We discussed these other possible diagnosis:  Symptoms are likely multifactorial.  MCP joint deformity and history of various episodes of hand swelling raises some concern for rheumatoid arthritis.  Patient also has Dupuytren contractures, and early likely swan-neck deformity of left middle finger.    Plan:  - Today's Plan of Care:  Inflammatory labs ordered  Rehab: Occupational Therapy: [facility] - [phone]  Referral to Hand Surgery    -We also discussed other future treatment options:  Referral to Rheumatology    Follow Up: will call with lab  results    Concerning signs and symptoms were reviewed.  The patient expressed understanding of this management plan and all questions were answered at this time.    Thanks for the opportunity to participate in the care of this patient, I will keep you updated on their progress.    CC: Tobin Saravia MD [facility]  Sports Medicine Physician  [facility]    -----  Chief Complaint   Patient presents with     Left Middle Finger - Pain     Left Ring Finger - Pain       SUBJECTIVE  Elvin YOEL Ware is a/an 67 year old male who is seen in consultation at the request of  Tobin Fernandez M.D. for evaluation of left hand, contracture of the hand.      The patient is seen by themselves.  The patient is Right handed    Onset: 3 month(s) ago. Reports insidious onset without acute precipitating event.  He states he had a gout flare up with a big swollen red, was on prednisone and then he couldn't flex fingers.    Location of Pain: left hand; middle and ring finger  Worsened by: flexion of the hand/fingers, playing guitar, riding his motorcycle  Better with: advil  Treatments tried: rest/activity avoidance and ibuprofen  Associated symptoms: swelling, warmth, redness, weakness of left 3rd and 4th and feeling of instability    Orthopedic/Surgical history: YES - Date: has previously had contractures on his right hand, has had many gout flare ups  Social History/Occupation:  of hearing aids, working on computer    No family history pertinent to patient's problem today.    REVIEW OF SYSTEMS:  Review of Systems  Skin: no bruising, no swelling  Musculoskeletal: as above  Neurologic: no numbness, paresthesias  Remainder of review of systems is negative including constitutional, CV, pulmonary, GI, except as noted in HPI or medical history.    OBJECTIVE:  /80   Pulse 85   Wt 110.7 kg (244 lb)   BMI 33.09 kg/m     General: healthy, alert and in no distress  HEENT: no scleral icterus or  conjunctival erythema  Skin: no suspicious lesions or rash. No jaundice.  CV: distal perfusion intact  Resp: normal respiratory effort without conversational dyspnea   Psych: normal mood and affect  Gait: normal steady gait with appropriate coordination and balance  Neuro: Normal light sensory exam of upper extremity    Bilateral Wrist and Hand exam    Inspection:       Swelling: MCP joints bilaterally and palmar contractures bilaterally    Tender:       no    Non Tender:       Remainder of the Wrist and Hand bilateral    ROM:       Decreased ability for flex at PIP joints 3rd - 4th fingers on the left    Strength:       Decreased strength 3rd and 4th fingers left hand    Neurovascular:       2+ radial pulses bilaterally with brisk capillary refill and      normal sensation to light touch in the radial, median and ulnar nerve distributions      RADIOLOGY:  I independently ordered, visualized and reviewed these images with the patient  3 XR views of left hand reviewed: no acute bony abnormality, scattered degenerative changes throughout  - will follow official read    Review of the result(s) of each unique test - XR

## 2022-07-27 ENCOUNTER — HOSPITAL ENCOUNTER (OUTPATIENT)
Dept: OCCUPATIONAL THERAPY | Facility: CLINIC | Age: 67
Setting detail: THERAPIES SERIES
Discharge: HOME | End: 2022-07-27
Attending: PEDIATRICS
Payer: COMMERCIAL

## 2022-07-27 DIAGNOSIS — M79.642 LEFT HAND PAIN: ICD-10-CM

## 2022-07-27 DIAGNOSIS — M20.032 SWAN-NECK DEFORMITY OF FINGER OF LEFT HAND: ICD-10-CM

## 2022-07-27 DIAGNOSIS — M19.049 HAND ARTHRITIS: ICD-10-CM

## 2022-07-27 DIAGNOSIS — M72.0 DUPUYTREN CONTRACTURE: ICD-10-CM

## 2022-07-27 PROCEDURE — 97022 WHIRLPOOL THERAPY: CPT | Mod: GO | Performed by: OCCUPATIONAL THERAPIST

## 2022-07-27 PROCEDURE — 97165 OT EVAL LOW COMPLEX 30 MIN: CPT | Mod: GO | Performed by: OCCUPATIONAL THERAPIST

## 2022-07-27 PROCEDURE — 97760 ORTHOTIC MGMT&TRAING 1ST ENC: CPT | Mod: GO | Performed by: OCCUPATIONAL THERAPIST

## 2022-07-27 PROCEDURE — 97110 THERAPEUTIC EXERCISES: CPT | Mod: GO | Performed by: OCCUPATIONAL THERAPIST

## 2022-07-27 NOTE — PROGRESS NOTES
07/27/22 1  Hand Therapy Evaluation   Quick Adds   Quick Adds Certification   Therapy Certification   Certification date from 07/27/22   Certification date to 09/21/22   Medical Diagnosis Left hand pain, Dupuytren contracture, hand arthritis, swan neck deformity finger of left hand   Certification I certify the need for these services furnished under this plan of treatment and while under my care.  (Physician co-signature of this document indicates review and certification of the therapy plan).   General Information/History   Start Of Care Date 07/27/22   Referring Physician Dr. 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   Additional Occupational Profile Info/Pertinent history of current problem Patient relates that he is having trouble with his left hand bending. He does have a dupuytren contracture on the right but he is not concerned about that. He relates he woke up with hand swollen and unable to bend left fingers. He was given prednisone that helped but has not been able to bend fingers since   Previous treatment or current condition none other than prednisone   Past medical history high blood pressure , smoker   How/Where did it occur From insidious onset   Onset date of current episode/exacerbation 04/15/22   Chronicity New   Hand Dominance Right   Affected side Left   Functional limitations perform activities of daily living;perform required work activities;perform desired leisure / sports activities   Reported Symptoms Pain;Loss of Motion/Stiffness;Edema   Prior level of function Independent ADL;Independent IADL   Important Activities ride motorcycle, hunt , fish, archery   Patient role/Employment history Employed   Occupation    Employment Status Working in normal job without restrictions   Primary Job Tasks Keyboarding;Prolonged sitting;Using a mouse   Patient/Family goals statement Patient would like to shampoo  hair, pull clutch on motorcycle   Fall Risk Screen   Fall screen completed by OT   Have you fallen 2 or more times in the past year? No   Have you fallen and had an injury in the past year? No   Is patient a fall risk? No   Abuse Screen (yes response referral indicated)   Feels Unsafe at Home or Work/School no   Feels Threatened by Someone no   Does Anyone Try to Keep You From Having Contact with Others or Doing Things Outside Your Home? no   Physical Signs of Abuse Present no   Pain   Pain Primary Pain Report   Primary Pain Report   Location left middle ring and pinky finger   Pain Quality Cramping;Dull   Frequency Intermittent   Scale 7/10;2/10   Pain Is Same All Of The Time   Pain Is Exacerbated By Lifting;Carrying;Gripping   Pain Is Relieved By Ice;Nsaids,analgesics   Progression Since Onset Gradually Improving   Edema   Edema Distal Palmar Crease   Edema Comments mils swell over MCPs   Distal Palmar Crease (measured in cm)   - Left 22.5   - Right cant compare to other side due to dupuytren   Skin Appearance   Skin Appearance Other (Comment)   Skin Appearance Comments presents with MP flexion of index through small and swan neck in index through small fingers   Tenderness   Overall - Left tender at PIP joints   ROM   ROM AROM   AROM   Comments   AROM (PROM):   Ext/Flex INDEX LONG RING SMALL     MP 30/100 40/100 40/100 30/90      PIP +5/17 +15/40 +15/80 +5/70      DIP 0/67 0/20 0/40 0/35      Composite flexion cm to DPM 1 7 4 5          Sensation Findings   Sensation Findings Sensation   Sensation Comments no sensory complaints   Strength   Strength Strength;Other (see comments)    Avg - Left 30    Avg - Right 90   Lateral Pinch - Left 15   Lateral Pinch - Right 18   3 Point Pinch - Left 4   3 Point Pinch - Right 14   Education Assessment   Preferred Learning Style Demonstration   Barriers to Learning No barriers   Therapy Interventions   Planned Therapy Interventions  Paraffin;Fluidotherapy;Strengthening;ROM;Stretching;Manual Therapy;Splinting;Education of splint wear, care, fit and precautions;Edema Management;Home Program;Self Care/Home Management   Therapy plan comments to be added as appropriate   Clinical Impression   Criteria for Skilled Therapeutic Interventions Met yes;treatment indicated   OT Diagnosis decreased ADL's IADL's   Influenced by the following impairments Pain;Edema;Decreased range of motion;Decreased strength   Assessment of Occupational Performance 5 or more Performance Deficits   Identified Performance Deficits hobbies, hanging onto motorcycle, lifting, work tasks, preparing food, cleaning , using tools and appliances, opening jar , dressing   Clinical Decision Making (Complexity) Low complexity   Therapy Frequency 1x/week   Predicted Duration of Therapy Intervention (days/wks) 8 weeks   Risks and Benefits of Treatment have been explained. Yes   Patient, Family & other staff in agreement with plan of care Yes   Clinical Impression Comments Patient presents with irregular patter in hand with increased MP  flexion and swan necks deformities starting in middle and ring fingers and limited PIP flexion causing muscle imbalance to .   Hand Eval Goals   Hand Eval Goals 1;2;3   Hand Goal 1   Goal Identifier home program   Goal Description Patient to be able to complete home program with less than 15% verbal and physical cuing to be independent   Target Date 08/17/22   Hand Goal 2   Goal Identifier wash hair   Goal Description Patient  to  be able to wash hair with both hands with minimal difficulty   Target Date 08/31/22   Hand Goal 3   Goal Identifier motorcycle   Goal Description Patient to increase PIP flexion by 3 cm to be able to safely pull clutch on motorcycle   Target Date 09/21/22   Total Evaluation Time   OT Eval, Low Complexity Minutes (19124) 54

## 2022-07-27 NOTE — PROGRESS NOTES
[facility]      OUTPATIENT OCCUPATIONAL THERAPY HAND EVALUATION  PLAN OF TREATMENT FOR OUTPATIENT REHABILITATION  (COMPLETE FOR INITIAL CLAIMS ONLY)  Patient's Last Name, First Name, M.I.  YOB: 1955  Elvin Ware                        Provider s Name: [facility] Medical Record No.  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  Goal Identifier: home program       Goal Description: Patient to be able to complete home program with less than 15% verbal and physcial cuing to be independent       Target Date: 08/17/22   2. Goal Identifier: wash hair       Goal Description: Patient  to  be able to wash hair with both hands with minimal difficulty       Target Date: 08/31/22   3. Goal Identifier: motorcycle       Goal Description: Patient to increase PIP flexion by 3 cm to be able to safely pull clutch on motorcyle       Target Date: 09/21/22                      Treatment Frequency: Therapy Frequency: 1x/week  Predicated Duration of Therapy Intervention:  Predicted Duration of Therapy Intervention (days/wks): 8 weeks    SHAJI Harris/L CHT  Occupational Therapist, Certified Hand Therapist         I CERTIFY THE NEED FOR THESE SERVICES FURNISHED UNDER         THIS PLAN OF TREATMENT AND WHILE UNDER MY CARE     (Physician co-signature of this document indicates review and certification of the therapy plan).                Certification Period:  07/27/22 to 09/21/22            Referring Physician:  Dr. Saravia    Initial Assessment        See Epic Evaluation Start of Care Date: 07/27/22

## 2022-07-28 ENCOUNTER — LAB (OUTPATIENT)
Dept: LAB | Facility: CLINIC | Age: 67
End: 2022-07-28
Payer: COMMERCIAL

## 2022-07-28 DIAGNOSIS — M20.032 SWAN-NECK DEFORMITY OF FINGER OF LEFT HAND: ICD-10-CM

## 2022-07-28 DIAGNOSIS — M79.642 LEFT HAND PAIN: ICD-10-CM

## 2022-07-28 DIAGNOSIS — M19.049 HAND ARTHRITIS: ICD-10-CM

## 2022-07-28 DIAGNOSIS — M72.0 DUPUYTREN CONTRACTURE: ICD-10-CM

## 2022-07-28 LAB
CRP SERPL-MCNC: 11 MG/L (ref 0–8)
ERYTHROCYTE [SEDIMENTATION RATE] IN BLOOD BY WESTERGREN METHOD: 29 MM/HR (ref 0–20)

## 2022-07-28 PROCEDURE — 85652 RBC SED RATE AUTOMATED: CPT

## 2022-07-28 PROCEDURE — 86200 CCP ANTIBODY: CPT

## 2022-07-28 PROCEDURE — 36415 COLL VENOUS BLD VENIPUNCTURE: CPT

## 2022-07-28 PROCEDURE — 86431 RHEUMATOID FACTOR QUANT: CPT

## 2022-07-28 PROCEDURE — 86140 C-REACTIVE PROTEIN: CPT

## 2022-07-29 LAB
CCP AB SER IA-ACNC: 0.9 U/ML
RHEUMATOID FACT SER NEPH-ACNC: 7 IU/ML

## 2022-08-08 DIAGNOSIS — M79.642 LEFT HAND PAIN: Primary | ICD-10-CM

## 2022-08-08 DIAGNOSIS — M19.049 HAND ARTHRITIS: ICD-10-CM

## 2022-08-10 ENCOUNTER — HOSPITAL ENCOUNTER (OUTPATIENT)
Dept: OCCUPATIONAL THERAPY | Facility: CLINIC | Age: 67
Setting detail: THERAPIES SERIES
Discharge: HOME | End: 2022-08-10
Attending: PEDIATRICS
Payer: COMMERCIAL

## 2022-08-10 PROCEDURE — 97110 THERAPEUTIC EXERCISES: CPT | Mod: GO | Performed by: OCCUPATIONAL THERAPIST

## 2022-08-10 PROCEDURE — L3913 HFO W/O JOINTS CF: HCPCS | Performed by: OCCUPATIONAL THERAPIST

## 2022-08-10 PROCEDURE — 97760 ORTHOTIC MGMT&TRAING 1ST ENC: CPT | Mod: GO | Performed by: OCCUPATIONAL THERAPIST

## 2022-08-10 PROCEDURE — 97022 WHIRLPOOL THERAPY: CPT | Mod: GO | Performed by: OCCUPATIONAL THERAPIST

## 2022-08-24 ENCOUNTER — HOSPITAL ENCOUNTER (OUTPATIENT)
Dept: OCCUPATIONAL THERAPY | Facility: CLINIC | Age: 67
Setting detail: THERAPIES SERIES
Discharge: HOME | End: 2022-08-24
Attending: PEDIATRICS
Payer: COMMERCIAL

## 2022-08-24 PROCEDURE — 97110 THERAPEUTIC EXERCISES: CPT | Mod: GO | Performed by: OCCUPATIONAL THERAPIST

## 2022-08-24 PROCEDURE — 97022 WHIRLPOOL THERAPY: CPT | Mod: GO | Performed by: OCCUPATIONAL THERAPIST

## 2022-09-12 ENCOUNTER — HOSPITAL ENCOUNTER (OUTPATIENT)
Dept: OCCUPATIONAL THERAPY | Facility: CLINIC | Age: 67
Setting detail: THERAPIES SERIES
Discharge: HOME | End: 2022-09-12
Attending: PEDIATRICS
Payer: COMMERCIAL

## 2022-09-12 PROCEDURE — 97110 THERAPEUTIC EXERCISES: CPT | Mod: GO | Performed by: OCCUPATIONAL THERAPIST

## 2022-09-12 PROCEDURE — 97140 MANUAL THERAPY 1/> REGIONS: CPT | Mod: GO | Performed by: OCCUPATIONAL THERAPIST

## 2022-09-12 PROCEDURE — 97035 APP MDLTY 1+ULTRASOUND EA 15: CPT | Mod: GO | Performed by: OCCUPATIONAL THERAPIST

## 2022-12-01 ENCOUNTER — IMMUNIZATION (OUTPATIENT)
Dept: FAMILY MEDICINE | Facility: CLINIC | Age: 67
End: 2022-12-01
Payer: COMMERCIAL

## 2022-12-01 PROCEDURE — 90677 PCV20 VACCINE IM: CPT

## 2022-12-01 PROCEDURE — 0124A COVID-19 VACCINE BIVALENT BOOSTER 12+ (PFIZER): CPT

## 2022-12-01 PROCEDURE — 91312 COVID-19 VACCINE BIVALENT BOOSTER 12+ (PFIZER): CPT

## 2022-12-01 PROCEDURE — 90714 TD VACC NO PRESV 7 YRS+ IM: CPT

## 2022-12-01 PROCEDURE — 90662 IIV NO PRSV INCREASED AG IM: CPT

## 2022-12-01 PROCEDURE — 90471 IMMUNIZATION ADMIN: CPT

## 2022-12-01 PROCEDURE — 90472 IMMUNIZATION ADMIN EACH ADD: CPT

## 2022-12-06 ENCOUNTER — OFFICE VISIT (OUTPATIENT)
Dept: RHEUMATOLOGY | Facility: CLINIC | Age: 67
End: 2022-12-06
Payer: COMMERCIAL

## 2022-12-06 VITALS
HEIGHT: 72 IN | WEIGHT: 249 LBS | BODY MASS INDEX: 33.72 KG/M2 | DIASTOLIC BLOOD PRESSURE: 86 MMHG | SYSTOLIC BLOOD PRESSURE: 150 MMHG

## 2022-12-06 DIAGNOSIS — M79.642 LEFT HAND PAIN: Primary | ICD-10-CM

## 2022-12-06 DIAGNOSIS — R79.82 ELEVATED C-REACTIVE PROTEIN (CRP): ICD-10-CM

## 2022-12-06 DIAGNOSIS — R70.0 ELEVATED SED RATE: ICD-10-CM

## 2022-12-06 LAB
CRP SERPL-MCNC: 4.78 MG/L
ERYTHROCYTE [SEDIMENTATION RATE] IN BLOOD BY WESTERGREN METHOD: 14 MM/HR (ref 0–20)
URATE SERPL-MCNC: 8.6 MG/DL (ref 3.4–7)

## 2022-12-06 PROCEDURE — 36415 COLL VENOUS BLD VENIPUNCTURE: CPT | Performed by: PHYSICIAN ASSISTANT

## 2022-12-06 PROCEDURE — 85652 RBC SED RATE AUTOMATED: CPT | Performed by: PHYSICIAN ASSISTANT

## 2022-12-06 PROCEDURE — 99204 OFFICE O/P NEW MOD 45 MIN: CPT | Performed by: PHYSICIAN ASSISTANT

## 2022-12-06 PROCEDURE — 84550 ASSAY OF BLOOD/URIC ACID: CPT | Performed by: PHYSICIAN ASSISTANT

## 2022-12-06 PROCEDURE — 86038 ANTINUCLEAR ANTIBODIES: CPT | Performed by: PHYSICIAN ASSISTANT

## 2022-12-06 PROCEDURE — 86140 C-REACTIVE PROTEIN: CPT | Performed by: PHYSICIAN ASSISTANT

## 2022-12-06 PROCEDURE — 86039 ANTINUCLEAR ANTIBODIES (ANA): CPT | Performed by: PHYSICIAN ASSISTANT

## 2022-12-06 NOTE — PROGRESS NOTES
Rheumatology Clinic Visit  [facility]  PRUDENCIO Wyattelsi Ware MRN# [id number]   YOB: 1955 Age: 67 year old   Date of Visit: 12/06/2022  Primary care provider: José Miguel Maynard          Assessment and Plan:     1.  Left hand pain  2.  Elevated sed rate  3.  Elevated C-reactive protein    Patient presents today for pain regarding his left hand.  He states that this has been getting worse over the last couple of years.  Initially he thought that the pain was secondary to gout.  He tried prednisone and colchicine which did not provide significant benefit.  He now finds that he is unable to flex his left third and fourth PIPs unless he does not manually.  Physical examination did not show any active synovitis.  He does have some ulnar deviation bilaterally.  He does have some enlargement of the second MCP bilaterally.  He does have extension of the third and fourth PIPs on the left that are able to be flexed but only manually.  Previous laboratory evaluations and imaging studies reviewed.  Results below.    The patient is certainly having some evidence of changes to his joints.  X-ray did not show any significant inflammatory changes.  At this time would recommend checking his uric acid as he does have a history of gout.  His CCP antibody and rheumatoid factor were normal.  He was very tender to touch the PIPs on the third and fourth digits on the left.  Would recommend getting an MRI of that left hand.  We will also recheck his sed rate and his C-reactive protein.  I will contact the patient once the results of these tests are complete.    Plan:     1. Schedule follow-up with Acacia Cuevas PA-C in 1-2 months.   2. Imaging: MRI left hand, you would call [phone] to schedule this  3. Labs: uric acid, Sed Rate CRP and MARGARITA today    PRUDENCIO Wyatt  Rheumatology         History of Present Illness:   Elvin Ware presents for evaluation of pain of left hand.     He  reports having pain in his left hand. He thought it was a gout flare. He states that it was getting worse and worse and was different than his gout flares. He did 2 rounds of Prednisone which helped with the pain but not with the swelling. He also took his Colchicine. He saw PT. He did not notice a change. He continues to do the exercises at home. He has not had any Prednisone since about August. He states that today his last 3 fingers curl up. He wears braces to do his exercises. He states that the pain in his left hand varies. He had a lot of pain when bow hunting. He is currently using Aleve or Advil. If it gets hit or he bends the hand wrong, this is painful. No skin rashes or mouth sores. No personal history of psoriasis, ulcerative colitis or crohn's. His gout has moved around. He does ache more with the winter as well.     Mother had a lot of arthritis. Grandfather may have had arthritis, but he had severe gout. He is on Allopurinol 100mg daily. He uses Colchicine for flares.          Review of Systems:     Constitutional: negative  Skin: negative  Eyes: negative  Ears/Nose/Throat: negative  Respiratory: No shortness of breath, dyspnea on exertion, cough, or hemoptysis  Cardiovascular: negative  Gastrointestinal: negative  Genitourinary: negative  Musculoskeletal: as above  Neurologic: negative  Psychiatric: negative  Hematologic/Lymphatic/Immunologic: negative  Endocrine: negative         Active Problem List:     Patient Active Problem List    Diagnosis Date Noted     Hypertension, unspecified type 08/28/2019     Priority: Medium     Gout 12/03/2012     Priority: Medium     Advanced directives, counseling/discussion 12/03/2012     Priority: Medium     Discussed advance care planning with patient; information given to patient to review. 12/3/2012        Tobacco abuse 01/03/2011     Priority: Medium     CARDIOVASCULAR SCREENING; LDL GOAL LESS THAN 130 10/31/2010     Priority: Medium     [place] Risk Score:  30% (19 Total Points)- advised to follow up with PCP soon- may benefit from statin         Cervical Radiculopathy at C6-left 10/06/2008     Priority: Medium     Rib fractures 2008     Priority: Medium     Fracture of clavicle, closed 2008     Priority: Medium     left  (Problem list name updated by automated process. Provider to review and confirm.)       Pneumohemothorax 2008     Priority: Medium     bilateral with right worse than left              Past Medical History:     Past Medical History:   Diagnosis Date     Gout     history of gout     Past Surgical History:   Procedure Laterality Date     SURGICAL HISTORY OF -       right knee scope     ZZC APPENDECTOMY              Social History:     Social History     Socioeconomic History     Marital status:      Spouse name: Not on file     Number of children: Not on file     Years of education: Not on file     Highest education level: Not on file   Occupational History     Not on file   Tobacco Use     Smoking status: Every Day     Packs/day: 1.00     Years: 30.00     Pack years: 30.00     Types: Cigarettes     Last attempt to quit: 2008     Years since quittin.4     Smokeless tobacco: Never   Substance and Sexual Activity     Alcohol use: Yes     Comment: rare      Drug use: No     Sexual activity: Never   Other Topics Concern     Parent/sibling w/ CABG, MI or angioplasty before 65F 55M? Yes     Comment: mother MI at 59 yo/ maternal uncle MI in his 50s   Social History Narrative     Not on file     Social Determinants of Health     Financial Resource Strain: Not on file   Food Insecurity: Not on file   Transportation Needs: Not on file   Physical Activity: Not on file   Stress: Not on file   Social Connections: Not on file   Intimate Partner Violence: Not on file   Housing Stability: Not on file          Family History:     Family History   Problem Relation Age of Onset     Diabetes Mother      Breast Cancer Mother      FIDELIA  Mother         MI age 64     Gout Mother      Respiratory Father         copd, like, exposure to chemical     Cancer Father         lung cancer     Respiratory Maternal Grandfather         copd     Cerebrovascular Disease Paternal Grandmother      Neurologic Disorder Brother         post polio complications     Cancer - colorectal No family hx of      Prostate Cancer No family hx of             Allergies:     Allergies   Allergen Reactions     Bees      Morphine      Penicillins      Indomethacin Rash            Medications:     Current Outpatient Medications   Medication Sig Dispense Refill     allopurinol (ZYLOPRIM) 100 MG tablet Take 1 tablet (100 mg) by mouth daily 90 tablet 3     amLODIPine (NORVASC) 10 MG tablet Take 1 tablet (10 mg) by mouth daily 90 tablet 1     colchicine (COLCYRS) 0.6 MG tablet Take 2 tabs at onset of gout flare, then take 1 tab one hour later, if not resolved the next day take one tab bid. (Patient not taking: No sig reported) 20 tablet 1     lisinopril (ZESTRIL) 40 MG tablet Take 1 tablet (40 mg) by mouth daily 90 tablet 1     nicotine (NICODERM CQ) 14 MG/24HR 24 hr patch Place 1 patch onto the skin every 24 hours 30 patch 1            Physical Exam:   There were no vitals taken for this visit.  Wt Readings from Last 6 Encounters:   07/22/22 110.7 kg (244 lb)   07/11/22 110.7 kg (244 lb)   06/27/22 114.3 kg (252 lb)   05/07/22 112 kg (247 lb)   10/13/20 118.8 kg (262 lb)   03/13/20 124.3 kg (274 lb)     Constitutional: well-developed, appearing stated age; cooperative  Eyes: nl PERRLA, conjunctiva, sclera  ENT: nl external ears, nose, hearing, lips, teeth, gums, throat. No mucositis.   No mucous membrane lesions, normal saliva pool  Neck: no mass or thyroid enlargement  Resp: lungs clear to auscultation  CV: RRR, no murmurs, rubs or gallops, no edema  Lymph: no cervical, supraclavicular or epitrochlear nodes  MS: No active synovitis or altered joint anatomy.  Patient is significant  tenderness to palpation of his third and fourth PIPs on the left.  He has extension of these joints that can be manually flexed.  He is unable to flex them otherwise.  Decreased fist formation on the left.  Right fifth tendon is flexed.  Skin: no nail pitting, alopecia, rash, nodules or lesions.   Neuro: nl cranial nerves.   Psych: nl judgement, orientation, memory, affect.           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  Mild osteoarthrosis at the STT joint. Moderate osteoarthrosis at the  first CMC joint. Small calcification radial to the trapezium, probably  from old trauma or less likely an intra-articular body. Mild  osteoarthrosis in the interphalangeal joints. No erosions or other  signs of inflammatory arthropathy. No fracture.    Laboratory:  2022  CRP 11.0  CCP antibody 0.9  Rheumatoid factor 7  Sed rate 29

## 2022-12-07 LAB
ANA PAT SER IF-IMP: ABNORMAL
ANA SER QL IF: ABNORMAL
ANA TITR SER IF: ABNORMAL {TITER}

## 2022-12-09 ENCOUNTER — HOSPITAL ENCOUNTER (OUTPATIENT)
Dept: MRI IMAGING | Facility: CLINIC | Age: 67
Discharge: HOME | End: 2022-12-09
Attending: PHYSICIAN ASSISTANT | Admitting: PHYSICIAN ASSISTANT
Payer: COMMERCIAL

## 2022-12-09 DIAGNOSIS — M79.642 LEFT HAND PAIN: ICD-10-CM

## 2022-12-09 DIAGNOSIS — R79.82 ELEVATED C-REACTIVE PROTEIN (CRP): ICD-10-CM

## 2022-12-09 DIAGNOSIS — R70.0 ELEVATED SED RATE: ICD-10-CM

## 2022-12-09 PROCEDURE — 255N000002 HC RX 255 OP 636: Performed by: PHYSICIAN ASSISTANT

## 2022-12-09 PROCEDURE — 73220 MRI UPPR EXTREMITY W/O&W/DYE: CPT | Mod: 26 | Performed by: RADIOLOGY

## 2022-12-09 PROCEDURE — A9585 GADOBUTROL INJECTION: HCPCS | Performed by: PHYSICIAN ASSISTANT

## 2022-12-09 PROCEDURE — 73220 MRI UPPR EXTREMITY W/O&W/DYE: CPT | Mod: LT

## 2022-12-09 RX ORDER — GADOBUTROL 604.72 MG/ML
11 INJECTION INTRAVENOUS ONCE
Status: COMPLETED | OUTPATIENT
Start: 2022-12-09 | End: 2022-12-09

## 2022-12-09 RX ADMIN — GADOBUTROL 11 ML: 604.72 INJECTION INTRAVENOUS at 17:58

## 2023-01-17 NOTE — PROGRESS NOTES
09/12/22 Note: This is a copy of patient's last daily visit and will also serve as their Discharge Summary as they have not been in for further treatment 30 days past this date. Final assessment of goals and physical and functional status , therefore unavailable.    Signing Clinician's Name / Credentials   Signing clinician's name / credentials SHAJI Harris/L CHT   Session Number   Session Number 4(reporting period 7/27/22 to 9/12/22)   Authorization status [employer] - not medicare   Quick Add   Quick Add  Hand   Hand   Referring Physician Dr. 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   Orders Evaluate And Treat As Indicated   Adult OT Eval Goals   Hand Eval Goals 1;2;3   Hand Goal 1   Goal Identifier home program   Goal Description Patient to be able to complete home program with less than 15% verbal and physical cuing to be independent   Target Date 08/17/22   Date Met 08/24/22   Hand Goal 2   Goal Identifier wash hair   Goal Description Patient  to  be able to wash hair with both hands with minimal difficulty   Goal Progress still moderately difficulty but getting better   Target Date 08/31/22   Hand Goal 3   Goal Identifier motorcycle   Goal Description Patient to increase PIP flexion by 3 cm to be able to safely pull clutch on motorcycle   Goal Progress Is about 2 cm improved- cont and is now able to better ride motorcycle.   Target Date 09/21/22   Subjective Report   Subjective Report Does not feel like he is getting any better since last visit. Did archery yesterday and is now sore.   Initial Pain level 7/10  (at worst 2 at best)   Current Pain level 4/10   Objective Measures   Objective Measures Objective Measure 1   ROM Location (anatomical) Composite flexion I,M,R,S .5,5.5 ,3,3.5 was 1,7,4,5   Objective Measure 1   Objective Measure palpation to PIP joints   Details moderately painful   Modalities   Modalities  Ultrasound    Ultrasound       Skilled Intervention to enhance tissue healing   Treatment Detail 3mhz, over dorsal PIP joints at 50% pulsed, 3mhz, 1.0w/cm2   Therapeutic Interventions   Therapeutic Interventions Therapeutic Procedure/Exercise;Orthotics;Manual Therapy   Therapeutic Procedure/Exercise       Skilled Intervention education verbal and physical cuing   Treatment Detail blocking ex, hook fist gentle PROM of PIP joints,  Isometrics gripping   Manual Therapy       Skilled Intervention to enhance flexibility , decrease pain   Treatment Detail Massage soft tissue over dorsum of PIP joints with Modena balm   Assessments Completed   Assessments Completed although overall better, no significant improvement since last visit per patient report. Recommend him to try cross bow to help take stress of finger joints.   Education   Learner Patient   Readiness Eager   Method Explanation;Demonstration   Response Verbalizes understanding;Demonstrates understanding   Education Notes see home program   Plan   Home program blocking, hook fists, splint wear   Plan for next session Patient would like to try on his own. Will hold chart open for the next month or so for questions or concerns   Total Session Time